# Patient Record
Sex: MALE | Race: WHITE | Employment: OTHER | URBAN - METROPOLITAN AREA
[De-identification: names, ages, dates, MRNs, and addresses within clinical notes are randomized per-mention and may not be internally consistent; named-entity substitution may affect disease eponyms.]

---

## 2017-01-29 ASSESSMENT — ACTIVITIES OF DAILY LIVING (ADL)
DO_MEMBERS_OF_YOUR_HOUSEHOLD_USE_SAFETY_HELMETS?: N
DO_MEMBERS_OF_YOUR_HOUSEHOLD_USE_SUNSCREEN?: N
DO_MEMBERS_OF_YOUR_HOUSEHOLD_WEAR_SEAT_BELTS?: Y
ARE_THERE_FIREARMS_IN_YOUR_HOME?: N
ARE_THERE_SMOKE_DETECTORS_IN_YOUR_HOME?: Y
ARE_THERE_CARBON_MONOXIDE_DETECTORS_IN_YOUR_HOME?: Y

## 2017-02-01 ENCOUNTER — OFFICE VISIT (OUTPATIENT)
Dept: INTERNAL MEDICINE | Facility: CLINIC | Age: 67
End: 2017-02-01

## 2017-02-01 VITALS
DIASTOLIC BLOOD PRESSURE: 87 MMHG | RESPIRATION RATE: 16 BRPM | HEART RATE: 68 BPM | HEIGHT: 69 IN | SYSTOLIC BLOOD PRESSURE: 142 MMHG | BODY MASS INDEX: 27.25 KG/M2 | WEIGHT: 184 LBS

## 2017-02-01 DIAGNOSIS — E78.5 HYPERLIPIDEMIA, UNSPECIFIED HYPERLIPIDEMIA TYPE: ICD-10-CM

## 2017-02-01 DIAGNOSIS — Z11.59 NEED FOR HEPATITIS C SCREENING TEST: ICD-10-CM

## 2017-02-01 DIAGNOSIS — E03.4 HYPOTHYROIDISM DUE TO ACQUIRED ATROPHY OF THYROID: ICD-10-CM

## 2017-02-01 DIAGNOSIS — Z12.11 SCREEN FOR COLON CANCER: ICD-10-CM

## 2017-02-01 DIAGNOSIS — Z23 NEED FOR PROPHYLACTIC VACCINATION AGAINST STREPTOCOCCUS PNEUMONIAE (PNEUMOCOCCUS): ICD-10-CM

## 2017-02-01 DIAGNOSIS — Z23 NEED FOR INFLUENZA VACCINATION: ICD-10-CM

## 2017-02-01 DIAGNOSIS — R35.0 URINARY FREQUENCY: ICD-10-CM

## 2017-02-01 DIAGNOSIS — Z13.6 SCREENING FOR AAA (ABDOMINAL AORTIC ANEURYSM): ICD-10-CM

## 2017-02-01 DIAGNOSIS — E03.4 HYPOTHYROIDISM DUE TO ACQUIRED ATROPHY OF THYROID: Primary | ICD-10-CM

## 2017-02-01 DIAGNOSIS — Z12.11 SCREENING FOR COLON CANCER: ICD-10-CM

## 2017-02-01 DIAGNOSIS — Z23 NEED FOR PROPHYLACTIC VACCINATION AND INOCULATION AGAINST INFLUENZA: ICD-10-CM

## 2017-02-01 LAB
CHOLEST SERPL-MCNC: 134 MG/DL
HCV AB SERPL QL IA: NORMAL
HCV AB SERPL QL IA: NORMAL
HDLC SERPL-MCNC: 58 MG/DL
LDLC SERPL CALC-MCNC: 66 MG/DL
NONHDLC SERPL-MCNC: 76 MG/DL
PSA SERPL-ACNC: 2.25 UG/L (ref 0–4)
TRIGL SERPL-MCNC: 48 MG/DL
TSH SERPL DL<=0.005 MIU/L-ACNC: 1.22 MU/L (ref 0.4–4)

## 2017-02-01 RX ORDER — LEVOTHYROXINE SODIUM 112 UG/1
112 CAPSULE ORAL DAILY
Qty: 90 CAPSULE | Refills: 3 | Status: SHIPPED | OUTPATIENT
Start: 2017-02-01 | End: 2017-11-03

## 2017-02-01 RX ORDER — ATORVASTATIN CALCIUM 80 MG/1
80 TABLET, FILM COATED ORAL DAILY
Qty: 90 TABLET | Refills: 3 | Status: SHIPPED | OUTPATIENT
Start: 2017-02-01 | End: 2018-02-09

## 2017-02-01 ASSESSMENT — PAIN SCALES - GENERAL: PAINLEVEL: NO PAIN (0)

## 2017-02-01 NOTE — PROGRESS NOTES
Tay Hodges is a 67 year old year old male being seen today for   Chief Complaint   Patient presents with     Physical     Pt is here for a physical.        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 Generally doing well. Notes occasional urgency to urinate, occasional nocturia - this represents a change for him. No hematuria, no dysuria.   Using omeproazole only occasionally - twice in last 6 weeks. Notes that reflux sx associated with eating late at night and then going to bed - trying to make lifestyle changes.  Feeling a little anxious today, not sure why - attributes his BP elevation to that.   Will be retiring as of July 1.      ASSESSMENT/PLAN:  1. Healthy 67 year old man here for annual exam. Flu shot, PCV 13 given today. Agrees to colonoscopy for screening after previously declining. Hepatitis C ab screening.  2. Hyperlipidemia - lipids on atorvastatin  3. Hypothyroidism - TSH on levothyroxine.  4. Mild urinary sx, likely due to early BPH. Exam with mild enlargement of prostate, no nodules. PSA today.  5. BP elevated, no dx of HTN. Repeat BP with nurse visit in next few weeks. If still elevated, plan on 24 hour BP monitor before committing to BP medication.  6. F/u 1 year with me, sooner as needed.      Patient Active Problem List   Diagnosis     Hypothyroidism     Hyperlipidemia     Nephrolithiasis     Lip lesion     Coronary atherosclerosis of native coronary artery     Hypothyroidism due to acquired atrophy of thyroid     Hyperlipidemia, unspecified hyperlipidemia       Family History   Problem Relation Age of Onset     CANCER       CANCER       CANCER Father      Lung cancer     Alzheimer Disease Mother      C.A.D. Maternal Grandfather        Immunization History   Administered Date(s) Administered     Influenza (IIV3) 12/14/2011, 12/19/2012, 01/08/2014, 01/14/2015, 01/20/2016     TD (ADULT, 7+) 07/01/2004     TDAP (BOOSTRIX AGES 10-64) 12/19/2012     Zoster vaccine, live 12/19/2012  "        PSA   Date Value Ref Range Status   01/14/2009 0.73 0 - 4 ug/L Final       ROS: See below      Current Outpatient Prescriptions   Medication Sig Dispense Refill     Levothyroxine Sodium 112 MCG CAPS Take 112 mcg by mouth daily 90 capsule 3     omeprazole (PRILOSEC) 20 MG capsule Take 1 capsule (20 mg) by mouth daily (Patient taking differently: Take 20 mg by mouth daily as needed ) 90 capsule 3     atorvastatin (LIPITOR) 80 MG tablet Take 1 tablet (80 mg) by mouth daily 90 tablet 3     ibuprofen (ADVIL) 200 MG tablet Take 200 mg by mouth every 4 hours as needed.       aspirin 325 MG tablet Take 325 mg by mouth daily.       [DISCONTINUED] levothyroxine (SYNTHROID) 125 MCG tablet Take 1 tablet (125 mcg) by mouth daily 90 tablet 3           PHYSICAL EXAM:    /87 mmHg  Pulse 68  Resp 16  Ht 1.745 m (5' 8.7\")  Wt 83.462 kg (184 lb)  BMI 27.41 kg/m2   Wt Readings from Last 1 Encounters:   02/01/17 83.462 kg (184 lb)          Constitutional: no distress, comfortable, pleasant   Eyes: anicteric, normal extra-ocular movements   Ears, Nose and Throat: tympanic membranes clear, nose clear and free of lesions, throat clear, neck supple with full range of motion, no thyromegaly.   Cardiovascular: regular rate and rhythm, normal S1 and S2, no murmurs, rubs or gallops, peripheral pulses full and symmetric   Respiratory: clear to auscultation, no wheezes or crackles, normal breath sounds   Gastrointestinal: positive bowel sounds, nontender, no hepatosplenomegaly, no masses   Rectal: normal tone, nontender normal prostate without nodules    Musculoskeletal: full range of motion, no edema   Skin: no concerning lesions, no jaundice   Neurological: cranial nerves intact, normal strength and sensation, reflexes at patella and biceps normal, normal gait, no tremor   Psychological: appropriate mood   Lymphatic: no cervical  lymphadenopathy    Kim Edwards MD    Answers for HPI/ROS submitted by the patient on " 1/29/2017   General Symptoms: No  Skin Symptoms: No  HENT Symptoms: No  EYE SYMPTOMS: No  HEART SYMPTOMS: No  LUNG SYMPTOMS: No  INTESTINAL SYMPTOMS: No  URINARY SYMPTOMS: No  REPRODUCTIVE SYMPTOMS: No  SKELETAL SYMPTOMS: No  BLOOD SYMPTOMS: No  NERVOUS SYSTEM SYMPTOMS: No  MENTAL HEALTH SYMPTOMS: No

## 2017-02-01 NOTE — PATIENT INSTRUCTIONS
Primary Care Center Medication Refill Request Information:  * Please contact your pharmacy regarding ANY request for medication refills.  ** [facility] Prescription Fax = [phone]  * Please allow 3 business days for routine medication refills.  * Please allow 5 business days for controlled substance medication refills.     Primary Care Center Test Result notification information:  *You will be notified with in 7-10 days of your appointment day regarding the results of your test.  If you are on MyChart you will be notified as soon as the provider has reviewed the results and signed off on them.    Please schedule the following appointments:  Gastroenterology [phone] (4th Floor [facility])   Primary Care Center [phone] (4th Floor [facility])

## 2017-02-01 NOTE — NURSING NOTE
"Administered flu vaccine to patient. Patient tolerated procedure well. See immunization records for details.   FLU VACCINE QUESTIONNAIRE:  Ask the following questions of all parties who want influenza vaccination:     CONTRAINDICATIONS  1.  Is the patient age less than 6 months?  NO  2.  Has the person to be vaccinated ever had Guillain-[place] syndrome? NO  3.  Has the person to be vaccinated had the vaccine this year? NO  4.  Is the person to be vaccinated sick today? NO  5.  Does the person to be vaccinated have an allergy to eggs or a component of the vaccine? NO  6.  Has the person to vaccinated ever had a serious reaction to an influenza vaccination in the past? NO    If the answer to ALL of the above questions is \"No\", then please administer the influenza vaccine per the standard protocol.  If the patient answered \"Yes\" to questions 1 or 2, do not administer the vaccine. If the patient answered \"Yes\" to question 3, do not administer the vaccine unless the patient is a child receiving the vaccine in two doses. If the patient answered \"Yes\" to questions 4, 5, and/or 6, get additional details on sickness and/or reaction and refer to provider. If you have any questions regarding contraindications, please refer to the provider.                                                         INFLUENZA VACCINATION NOTE      Information sheet given to patient and questions answered.   INDICATION FOR VACCINATION:  Anyone from 6 months of age or older.    ALEX Calderon LPN     "

## 2017-02-01 NOTE — NURSING NOTE
Chief Complaint   Patient presents with     Physical     Pt is here for a physical.      Shelly Calderon LPN February 1, 2017 8:01 AM

## 2017-02-01 NOTE — MR AVS SNAPSHOT
After Visit Summary   2/1/2017    Tay Hodges    MRN: [id number]           Patient Information     Date Of Birth          1950        Visit Information        Provider Department      2/1/2017 8:25 AM Kim Edwards MD [facility]        Today's Diagnoses     Hypothyroidism due to acquired atrophy of thyroid    -  1     Hyperlipidemia, unspecified hyperlipidemia type         Screening for colon cancer         Need for prophylactic vaccination against Streptococcus pneumoniae (pneumococcus)         Need for hepatitis C screening test         Need for influenza vaccination         Screen for colon cancer         Need for prophylactic vaccination and inoculation against influenza         Screening for AAA (abdominal aortic aneurysm)         Urinary frequency           Care Instructions    Primary Care Center Medication Refill Request Information:  * Please contact your pharmacy regarding ANY request for medication refills.  ** [facility] Prescription Fax = [phone]  * Please allow 3 business days for routine medication refills.  * Please allow 5 business days for controlled substance medication refills.     Primary Care Center Test Result notification information:  *You will be notified with in 7-10 days of your appointment day regarding the results of your test.  If you are on MyChart you will be notified as soon as the provider has reviewed the results and signed off on them.    Please schedule the following appointments:  Gastroenterology [phone] (4th Floor [facility])   Primary Care Center [phone] (4th Floor [facility])           Follow-ups after your visit        Additional Services     GASTROENTEROLOGY ADULT REF PROCEDURE ONLY       Last Lab Result: CREATININE (mg/dL)       Date                     Value                 12/19/2012               0.86             ----------  Body mass index is 27.41 kg/(m^2).     Needed:  No  Language:   English    Patient will be contacted to schedule procedure.     Please be aware that coverage of these services is subject to the terms and limitations of your health insurance plan.  Call member services at your health plan with any benefit or coverage questions.  Any procedures must be performed at a [facility] facility OR coordinated by your clinic's referral office.    Please bring the following with you to your appointment:    (1) Any X-Rays, CTs or MRIs which have been performed.  Contact the facility where they were done to arrange for  prior to your scheduled appointment.    (2) List of current medications   (3) This referral request   (4) Any documents/labs given to you for this referral                  Future tests that were ordered for you today     Open Future Orders        Priority Expected Expires Ordered    Prostate spec antigen screen Routine  2/1/2018 2/1/2017    Lipid panel reflex to direct LDL Routine  2/1/2018 2/1/2017    TSH with free T4 reflex Routine  2/1/2018 2/1/2017    Hepatitis C antibody Routine  2/1/2018 2/1/2017            Who to contact     Please call your clinic at [phone] to:    Ask questions about your health    Make or cancel appointments    Discuss your medicines    Learn about your test results    Speak to your doctor   If you have compliments or concerns about an experience at your clinic, or if you wish to file a complaint, please contact [facility] Patient Relations at [phone] or email us at [email or web address]         Additional Information About Your Visit        MyChart Information     [employer] gives you secure access to your electronic health record. If you see a primary care provider, you can also send messages to your care team and make appointments. If you have questions, please call your primary care clinic.  If you do not have a primary care provider, please call [phone] and they will assist you.      MyChart  "is an electronic gateway that provides easy, online access to your medical records. With [employer], you can request a clinic appointment, read your test results, renew a prescription or communicate with your care team.     To access your existing account, please contact your [facility] or call [phone] for assistance.        Care EveryWhere ID     This is your Care EveryWhere ID. This could be used by other organizations to access your [facility] medical records  [id number]        Your Vitals Were     Pulse Respirations Height BMI (Body Mass Index)          68 16 1.745 m (5' 8.7\") 27.41 kg/m2         Blood Pressure from Last 3 Encounters:   02/01/17 142/87   01/20/16 132/85   01/14/15 128/85    Weight from Last 3 Encounters:   02/01/17 83.462 kg (184 lb)   01/20/16 84.052 kg (185 lb 4.8 oz)   01/14/15 82.373 kg (181 lb 9.6 oz)              We Performed the Following     AORTIC CENTER NOTIFICATION     FLU VACCINE (HIGH DOSE), INCREASED ANTIGEN, PRESV FREE, AGE 65+     GASTROENTEROLOGY ADULT REF PROCEDURE ONLY     Hepatitis C Screen Reflex to HCV RNA Quant and Genotype     PNEUMOCOCCAL CONJ VACCINE 13 VALENT IM (PCV13)          Today's Medication Changes          These changes are accurate as of: 2/1/17  8:49 AM.  If you have any questions, ask your nurse or doctor.               These medicines have changed or have updated prescriptions.        Dose/Directions    omeprazole 20 MG CR capsule   Commonly known as:  priLOSEC   This may have changed:    - when to take this  - reasons to take this   Used for:  Gastroesophageal reflux disease, esophagitis presence not specified        Dose:  20 mg   Take 1 capsule (20 mg) by mouth daily   Quantity:  90 capsule   Refills:  3            Where to get your medicines      These medications were sent to [facility] [id number] [place], [place] [address] [facility]  [address] [place] [id number]     Phone:  " [phone]    - atorvastatin 80 MG tablet  - Levothyroxine Sodium 112 MCG Caps             Primary Care Provider Office Phone # Fax #    Kim Edwards [phone]        [facility] [address] [facility] [address]  [facility] [address]        Thank you!     Thank you for choosing [facility]  for your care. Our goal is always to provide you with excellent care. Hearing back from our patients is one way we can continue to improve our services. Please take a few minutes to complete the written survey that you may receive in the mail after your visit with us. Thank you!             Your Updated Medication List - Protect others around you: Learn how to safely use, store and throw away your medicines at www.disposemymeds.org.          This list is accurate as of: 2/1/17  8:49 AM.  Always use your most recent med list.                   Brand Name Dispense Instructions for use    ADVIL 200 MG tablet   Generic drug:  ibuprofen      Take 200 mg by mouth every 4 hours as needed.       aspirin 325 MG tablet      Take 325 mg by mouth daily.       atorvastatin 80 MG tablet    LIPITOR    90 tablet    Take 1 tablet (80 mg) by mouth daily       Levothyroxine Sodium 112 MCG Caps     90 capsule    Take 112 mcg by mouth daily       omeprazole 20 MG CR capsule    priLOSEC    90 capsule    Take 1 capsule (20 mg) by mouth daily

## 2017-02-02 ENCOUNTER — TELEPHONE (OUTPATIENT)
Dept: INTERNAL MEDICINE | Facility: CLINIC | Age: 67
End: 2017-02-02

## 2017-02-02 DIAGNOSIS — E03.4 HYPOTHYROIDISM DUE TO ACQUIRED ATROPHY OF THYROID: Primary | ICD-10-CM

## 2017-02-02 RX ORDER — LEVOTHYROXINE SODIUM 112 UG/1
112 TABLET ORAL DAILY
Qty: 90 TABLET | Refills: 3 | Status: SHIPPED | OUTPATIENT
Start: 2017-02-02 | End: 2018-02-09

## 2017-02-02 NOTE — TELEPHONE ENCOUNTER
Telephone Encounter    Caller: [facility].     Reason for Call/Caller's Request: Prescriber sent Rx for levothyroxine capsules, which according to the pharmacy is BRAND, not generic, and thus, more expensive.      Nursing Action or Plan: Rx generated for same dose levothyroxine in tablet form.

## 2017-02-08 ENCOUNTER — DOCUMENTATION ONLY (OUTPATIENT)
Dept: VASCULAR SURGERY | Facility: CLINIC | Age: 67
End: 2017-02-08

## 2017-02-14 ENCOUNTER — DOCUMENTATION ONLY (OUTPATIENT)
Dept: VASCULAR SURGERY | Facility: CLINIC | Age: 67
End: 2017-02-14

## 2017-05-09 ENCOUNTER — DOCUMENTATION ONLY (OUTPATIENT)
Dept: VASCULAR SURGERY | Facility: CLINIC | Age: 67
End: 2017-05-09

## 2017-11-03 ENCOUNTER — OFFICE VISIT (OUTPATIENT)
Dept: INTERNAL MEDICINE | Facility: CLINIC | Age: 67
End: 2017-11-03

## 2017-11-03 VITALS
OXYGEN SATURATION: 97 % | RESPIRATION RATE: 20 BRPM | DIASTOLIC BLOOD PRESSURE: 71 MMHG | BODY MASS INDEX: 27.66 KG/M2 | SYSTOLIC BLOOD PRESSURE: 151 MMHG | WEIGHT: 185.7 LBS | HEART RATE: 71 BPM

## 2017-11-03 DIAGNOSIS — Z23 NEED FOR PROPHYLACTIC VACCINATION AND INOCULATION AGAINST INFLUENZA: ICD-10-CM

## 2017-11-03 DIAGNOSIS — Z12.11 SCREEN FOR COLON CANCER: ICD-10-CM

## 2017-11-03 DIAGNOSIS — Z91.81 AT RISK FOR FALLING: ICD-10-CM

## 2017-11-03 DIAGNOSIS — G44.89 OTHER HEADACHE SYNDROME: Primary | ICD-10-CM

## 2017-11-03 ASSESSMENT — PAIN SCALES - GENERAL: PAINLEVEL: SEVERE PAIN (7)

## 2017-11-03 NOTE — NURSING NOTE
Chief Complaint   Patient presents with     Headache     Has severe headache ( back of neck) for a week, and is now into right ear    Marie Julien LPN 1:14 PM on 11/3/2017

## 2017-11-03 NOTE — MR AVS SNAPSHOT
After Visit Summary   11/3/2017    Tay Hodges    MRN: [id number]           Patient Information     Date Of Birth          1950        Visit Information        Provider Department      11/3/2017 1:20 PM Magnus Townsend MD [facility]        Today's Diagnoses     Other headache syndrome    -  1    Screen for colon cancer        At risk for falling        Need for prophylactic vaccination and inoculation against influenza          Care Instructions    Primary Care Center: [phone]     Primary Care Center Medication Refill Request Information:  * Please contact your pharmacy regarding ANY request for medication refills.  ** [facility] Fax = [phone]  * Please allow 3 business days for routine medication refills.  * Please allow 5 business days for controlled substance medication refills.     Primary Care Center Test Result notification information:  *You will be notified with in 7-10 days of your appointment day regarding the results of your test.  If you are on MyChart you will be notified as soon as the provider has reviewed the results and signed off on them.          Follow-ups after your visit        Future tests that were ordered for you today     Open Future Orders        Priority Expected Expires Ordered    Fecal colorectal cancer screen FIT - Future (S+30) Routine 11/24/2017 12/3/2017 11/3/2017            Who to contact     Please call your clinic at [phone] to:    Ask questions about your health    Make or cancel appointments    Discuss your medicines    Learn about your test results    Speak to your doctor   If you have compliments or concerns about an experience at your clinic, or if you wish to file a complaint, please contact [facility] Patient Relations at [phone] or email us at [email or web address]         Additional Information About Your Visit        MyChart Information     MyChart gives  you secure access to your electronic health record. If you see a primary care provider, you can also send messages to your care team and make appointments. If you have questions, please call your primary care clinic.  If you do not have a primary care provider, please call [phone] and they will assist you.      [employer] is an electronic gateway that provides easy, online access to your medical records. With [employer], you can request a clinic appointment, read your test results, renew a prescription or communicate with your care team.     To access your existing account, please contact your [facility] or call [phone] for assistance.        Care EveryWhere ID     This is your Care EveryWhere ID. This could be used by other organizations to access your [facility] medical records  [id number]        Your Vitals Were     Pulse Respirations Pulse Oximetry BMI (Body Mass Index)          71 20 97% 27.66 kg/m2         Blood Pressure from Last 3 Encounters:   11/03/17 151/71   02/01/17 142/87   01/20/16 132/85    Weight from Last 3 Encounters:   11/03/17 84.2 kg (185 lb 11.2 oz)   02/01/17 83.5 kg (184 lb)   01/20/16 84.1 kg (185 lb 4.8 oz)              We Performed the Following     CTA Angiogram Head     FLU VACCINE, INCREASED ANTIGEN, PRESV FREE, AGE 65+ [71246]        Primary Care Provider Office Phone # Fax #    Louann Villanueva [phone]       [address] [place] [address] [address] [place] [address]        Equal Access to Services     ALEX PEREZ : Hadii aad ku hadasho Soomaali, waaxda luqadaha, qaybta kaalmada adeegyada, lobo ewingn yojana downs . So [facility] [phone].    ATENCIÓN: Si habla español, tiene a fitch disposición servicios gratuitos de asistencia lingüística. Llame al [phone].    We comply with applicable federal civil rights laws and [place] laws. We do not discriminate on the basis of race, color, national origin, age, disability, sex,  sexual orientation, or gender identity.            Thank you!     Thank you for choosing [facility]  for your care. Our goal is always to provide you with excellent care. Hearing back from our patients is one way we can continue to improve our services. Please take a few minutes to complete the written survey that you may receive in the mail after your visit with us. Thank you!             Your Updated Medication List - Protect others around you: Learn how to safely use, store and throw away your medicines at www.disposemymeds.org.          This list is accurate as of: 11/3/17  2:47 PM.  Always use your most recent med list.                   Brand Name Dispense Instructions for use Diagnosis    ADVIL 200 MG tablet   Generic drug:  ibuprofen      Take 200 mg by mouth every 4 hours as needed.        aspirin 325 MG tablet      Take 325 mg by mouth daily.        atorvastatin 80 MG tablet    LIPITOR    90 tablet    Take 1 tablet (80 mg) by mouth daily    Hyperlipidemia, unspecified hyperlipidemia type       levothyroxine 112 MCG tablet    SYNTHROID/LEVOTHROID    90 tablet    Take 1 tablet (112 mcg) by mouth daily    Hypothyroidism due to acquired atrophy of thyroid

## 2017-11-03 NOTE — PROGRESS NOTES
HPI  67-year-old retired  presents today with a one-week history of headache. Said no past history of significant headaches or similar problems. Reported a week ago he had onset of severe pain in the right neck. This radiated up into the occipital area and more recently into the right ear. It has not bothered by bending over or position however it does wake him up at night periodically. He is not had any nausea or vomiting associated with this and there's been no focal neurologic symptoms. He denies any injury or trauma unusual movements or activities. His never had any similar headaches. He's been in excellent health until this episode. Otherwise he has no particular symptoms or concerns today.  Past Medical History:   Diagnosis Date     Coronary atherosclerosis of native coronary artery 12/19/2012    Mild disease seen on angiogram     Nephrolithiasis 12/13/2011     Other and unspecified hyperlipidemia 12/13/2011     Unspecified hypothyroidism 12/13/2011     No past surgical history on file.  Family History   Problem Relation Age of Onset     CANCER       CANCER       CANCER Father      Lung cancer     Alzheimer Disease Mother      C.A.D. Maternal Grandfather      Social History     Social History     Marital status:      Spouse name: N/A     Number of children: N/A     Years of education: N/A     Social History Main Topics     Smoking status: Never Smoker     Smokeless tobacco: Never Used     Alcohol use Yes      Comment: Occasional     Drug use: None     Sexual activity: Not Asked     Other Topics Concern     None     Social History Narrative     Complete review of symptoms negative except as noted above.    Exam:  /71 (BP Location: Right arm, Patient Position: Chair, Cuff Size: Adult Large)  Pulse 71  Resp 20  Wt 84.2 kg (185 lb 11.2 oz)  SpO2 97%  BMI 27.66 kg/m2  185 lbs 11.2 oz  Physical Exam   The patient is alert, oriented with a clear sensorium.   Skin shows no  lesions or rashes and good turgor.   Head is normocephalic and atraumatic.   Eyes show PERRLA with benign optic fundi, normal venous pulsations.   Ears show normal TMs bilaterally.   Mouth shows clear oral mucosa.   Neck shows no nodes, thyromegaly or bruits, some stiffness and decreased ROM, negative Kernigs and Brudzinskis signs.   Back is non tender.  Lungs are clear to percussion and auscultation.   Heart shows normal S1 and S2 without murmur or gallop.   Extremities show no edema and no evidence of active synovitis.   Neurologic examination shows cranial nerves II-XII intact. Motor shows 5/5 strength. Reflexes are symmetric. Cerebellar testing shows normal tandem gait.  Romberg negative.  CTA reviewed with radiologist showing no evidence of bleed or aneurysm    ASSESSMENT  1. Headache likely related to cervical osteoarthritis  2 hypertension aggravated by above  3 CAD stable    Plan  I'm and have him use heat and Tylenol and will anticipate resolution of the headache over the next week follow-up if increasing severity or new or different symptoms.  Over 25 minutes spent with patient the majority in counseling and coordinating care.      This note was completed using Dragon voice recognition software.  Although reviewed after completion, some word and grammatical errors may occur.    Magnus Townsend MD  General Internal Medicine  Primary Care Center  [phone]

## 2018-01-26 ASSESSMENT — ENCOUNTER SYMPTOMS
DYSURIA: 0
FLANK PAIN: 0
HEMATURIA: 0
DIFFICULTY URINATING: 0

## 2018-01-26 ASSESSMENT — ACTIVITIES OF DAILY LIVING (ADL)
IN_THE_PAST_7_DAYS,_DID_YOU_NEED_HELP_FROM_OTHERS_TO_PERFORM_EVERYDAY_ACTIVITIES_SUCH_AS_EATING,_GETTING_DRESSED,_GROOMING,_BATHING,_WALKING,_OR_USING_THE_TOILET: N
IN_THE_PAST_7_DAYS,_DID_YOU_NEED_HELP_FROM_OTHERS_TO_TAKE_CARE_OF_THINGS_SUCH_AS_LAUNDRY_AND_HOUSEKEEPING,_BANKING,_SHOPPING,_USING_THE_TELEPHONE,_FOOD_PREPARATION,_TRANSPORTATION,_OR_TAKING_YOUR_OWN_MEDICATIONS?: N

## 2018-02-09 ENCOUNTER — OFFICE VISIT (OUTPATIENT)
Dept: INTERNAL MEDICINE | Facility: CLINIC | Age: 68
End: 2018-02-09
Payer: MEDICARE

## 2018-02-09 ENCOUNTER — TELEPHONE (OUTPATIENT)
Dept: GASTROENTEROLOGY | Facility: CLINIC | Age: 68
End: 2018-02-09

## 2018-02-09 ENCOUNTER — HOSPITAL ENCOUNTER (OUTPATIENT)
Dept: CARDIOLOGY | Facility: CLINIC | Age: 68
Discharge: HOME | End: 2018-02-09
Attending: INTERNAL MEDICINE | Admitting: INTERNAL MEDICINE
Payer: MEDICARE

## 2018-02-09 VITALS
SYSTOLIC BLOOD PRESSURE: 152 MMHG | BODY MASS INDEX: 27.61 KG/M2 | TEMPERATURE: 97.9 F | HEIGHT: 69 IN | DIASTOLIC BLOOD PRESSURE: 90 MMHG | HEART RATE: 68 BPM | WEIGHT: 186.4 LBS | RESPIRATION RATE: 18 BRPM | OXYGEN SATURATION: 99 %

## 2018-02-09 DIAGNOSIS — Z12.11 SPECIAL SCREENING FOR MALIGNANT NEOPLASMS, COLON: ICD-10-CM

## 2018-02-09 DIAGNOSIS — E78.2 MIXED HYPERLIPIDEMIA: ICD-10-CM

## 2018-02-09 DIAGNOSIS — Z00.00 ROUTINE GENERAL MEDICAL EXAMINATION AT A HEALTH CARE FACILITY: ICD-10-CM

## 2018-02-09 DIAGNOSIS — Z00.00 ROUTINE GENERAL MEDICAL EXAMINATION AT A HEALTH CARE FACILITY: Primary | ICD-10-CM

## 2018-02-09 DIAGNOSIS — R03.0 ELEVATED BLOOD PRESSURE READING WITHOUT DIAGNOSIS OF HYPERTENSION: ICD-10-CM

## 2018-02-09 DIAGNOSIS — E78.5 HYPERLIPIDEMIA, UNSPECIFIED HYPERLIPIDEMIA TYPE: ICD-10-CM

## 2018-02-09 DIAGNOSIS — E03.9 HYPOTHYROIDISM, UNSPECIFIED TYPE: ICD-10-CM

## 2018-02-09 DIAGNOSIS — R35.0 URINARY FREQUENCY: ICD-10-CM

## 2018-02-09 DIAGNOSIS — R93.5 ABNORMAL ABDOMINAL ULTRASOUND: ICD-10-CM

## 2018-02-09 DIAGNOSIS — E03.4 HYPOTHYROIDISM DUE TO ACQUIRED ATROPHY OF THYROID: ICD-10-CM

## 2018-02-09 DIAGNOSIS — Z23 NEED FOR PROPHYLACTIC VACCINATION AGAINST STREPTOCOCCUS PNEUMONIAE (PNEUMOCOCCUS): ICD-10-CM

## 2018-02-09 LAB
ALBUMIN SERPL-MCNC: 4.1 G/DL (ref 3.4–5)
ALP SERPL-CCNC: 42 U/L (ref 40–150)
ALT SERPL W P-5'-P-CCNC: 27 U/L (ref 0–70)
ANION GAP SERPL CALCULATED.3IONS-SCNC: 4 MMOL/L (ref 3–14)
AST SERPL W P-5'-P-CCNC: 18 U/L (ref 0–45)
BILIRUB SERPL-MCNC: 0.6 MG/DL (ref 0.2–1.3)
BUN SERPL-MCNC: 25 MG/DL (ref 7–30)
CALCIUM SERPL-MCNC: 9 MG/DL (ref 8.5–10.1)
CHLORIDE SERPL-SCNC: 104 MMOL/L (ref 94–109)
CHOLEST SERPL-MCNC: 138 MG/DL
CO2 SERPL-SCNC: 30 MMOL/L (ref 20–32)
CREAT SERPL-MCNC: 0.95 MG/DL (ref 0.66–1.25)
GFR SERPL CREATININE-BSD FRML MDRD: 80 ML/MIN/1.7M2
GLUCOSE SERPL-MCNC: 101 MG/DL (ref 70–99)
HDLC SERPL-MCNC: 58 MG/DL
LDLC SERPL CALC-MCNC: 73 MG/DL
NONHDLC SERPL-MCNC: 81 MG/DL
POTASSIUM SERPL-SCNC: 4.3 MMOL/L (ref 3.4–5.3)
PROT SERPL-MCNC: 7.5 G/DL (ref 6.8–8.8)
PSA SERPL-ACNC: 1.76 UG/L (ref 0–4)
SODIUM SERPL-SCNC: 138 MMOL/L (ref 133–144)
TRIGL SERPL-MCNC: 41 MG/DL
TSH SERPL DL<=0.005 MIU/L-ACNC: 1.28 MU/L (ref 0.4–4)

## 2018-02-09 PROCEDURE — 93790 AMBL BP MNTR W/SW I&R: CPT | Performed by: INTERNAL MEDICINE

## 2018-02-09 PROCEDURE — 93786 AMBL BP MNTR W/SW REC ONLY: CPT

## 2018-02-09 RX ORDER — ATORVASTATIN CALCIUM 80 MG/1
80 TABLET, FILM COATED ORAL DAILY
Qty: 90 TABLET | Refills: 3 | Status: SHIPPED | OUTPATIENT
Start: 2018-02-09 | End: 2019-01-24

## 2018-02-09 RX ORDER — LEVOTHYROXINE SODIUM 112 UG/1
112 TABLET ORAL DAILY
Qty: 90 TABLET | Refills: 3 | Status: SHIPPED | OUTPATIENT
Start: 2018-02-09 | End: 2019-01-24

## 2018-02-09 ASSESSMENT — PAIN SCALES - GENERAL: PAINLEVEL: NO PAIN (0)

## 2018-02-09 NOTE — MR AVS SNAPSHOT
After Visit Summary   2/9/2018    Tay Hodges    MRN: [id number]           Patient Information     Date Of Birth          1950        Visit Information        Provider Department      2/9/2018 8:25 AM Louann Villanueva MD [facility]        Today's Diagnoses     Routine general medical examination at a health care facility    -  1    Need for prophylactic vaccination against Streptococcus pneumoniae (pneumococcus)        Special screening for malignant neoplasms, colon        Elevated blood pressure reading without diagnosis of hypertension        Abnormal abdominal ultrasound        Hypothyroidism, unspecified type        Mixed hyperlipidemia        Urinary frequency          Care Instructions    Primary Care Center Medication Refill Request Information:  * Please contact your pharmacy regarding ANY request for medication refills.  ** [facility] Prescription Fax = [phone]  * Please allow 3 business days for routine medication refills.  * Please allow 5 business days for controlled substance medication refills.     Primary Care Center Test Result notification information:  *You will be notified with in 7-10 days of your appointment day regarding the results of your test.  If you are on MyChart you will be notified as soon as the provider has reviewed the results and signed off on them.    Primary Care Center [phone]     Colonoscopy  [phone]            Follow-ups after your visit        Additional Services     GASTROENTEROLOGY ADULT REF PROCEDURE ONLY       Last Lab Result: Creatinine (mg/dL)       Date                     Value                 12/19/2012               0.86             ----------  Body mass index is 27.53 kg/(m^2).     Needed:  No  Language:  English    Patient will be contacted to schedule procedure.     Please be aware that coverage of these services is subject to the terms and limitations of your health insurance plan.  Call member  services at your health plan with any benefit or coverage questions.  Any procedures must be performed at a [facility] facility OR coordinated by your clinic's referral office.    Please bring the following with you to your appointment:    (1) Any X-Rays, CTs or MRIs which have been performed.  Contact the facility where they were done to arrange for  prior to your scheduled appointment.    (2) List of current medications   (3) This referral request   (4) Any documents/labs given to you for this referral                  Follow-up notes from your care team     Return in about 1 year (around 2/9/2019) for Physical Exam.      Your next 10 appointments already scheduled     Feb 09, 2018  9:30 AM CST   LAB with  LAB   [facility] ([facility])    [address] [place] [id number] [phone]           Please do not eat 10-12 hours before your appointment if you are coming in fasting for labs on lipids, cholesterol, or glucose (sugar). This does not apply to pregnant women. Water, hot tea and black coffee (with nothing added) are okay. Do not drink other fluids, diet soda or chew gum.            Feb 09, 2018 10:30 AM CST   24 Hour Blood Pressure Monitor with UUEKGM   [facility] ([facility]    [address] [facility] [address]               Feb 12, 2018  9:45 AM CST   US ABDOMEN LIMITED with UCUS2   [facility] ([facility])    [address] [place] [address] [phone]           Please bring a list of your medicines (including vitamins, minerals and over-the-counter drugs). Also, tell your doctor about any allergies you may have. Wear comfortable clothes and leave your valuables at home.  Adults: No eating or drinking for 8 hours before the exam. You may take medicine with a small sip of water.  Children: - Children 6+ years: No food  or drink for 6 hours before exam. - Children 1-5 years: No food or drink for 4 hours before exam. - Infants, breast-fed: may have breast milk up to 2 hours before exam. - Infants, formula: may have bottle until 4 hours before exam.  Please call the Imaging Department at your exam site with any questions.              Future tests that were ordered for you today     Open Future Orders        Priority Expected Expires Ordered    24 Hour Blood Pressure Monitor - Adult Routine  3/26/2018 2/9/2018    US Abdomen Limited Routine  2/9/2019 2/9/2018    PSA screen Routine 2/9/2018 2/9/2019 2/9/2018    Lipid panel reflex to direct LDL Fasting Routine 2/9/2018 2/23/2018 2/9/2018    Comprehensive metabolic panel Routine 2/9/2018 2/23/2018 2/9/2018    TSH Routine 2/9/2018 2/9/2019 2/9/2018            Who to contact     Please call your clinic at [phone] to:    Ask questions about your health    Make or cancel appointments    Discuss your medicines    Learn about your test results    Speak to your doctor            Additional Information About Your Visit        [employer] Information     [employer] gives you secure access to your electronic health record. If you see a primary care provider, you can also send messages to your care team and make appointments. If you have questions, please call your primary care clinic.  If you do not have a primary care provider, please call [phone] and they will assist you.      [employer] is an electronic gateway that provides easy, online access to your medical records. With [employer], you can request a clinic appointment, read your test results, renew a prescription or communicate with your care team.     To access your existing account, please contact your [facility] or call [phone] for assistance.        Care EveryWhere ID     This is your Care EveryWhere ID. This could be used by other organizations to access your [facility] medical records  [id number]       "  Your Vitals Were     Pulse Temperature Respirations Height Pulse Oximetry BMI (Body Mass Index)    68 97.9  F (36.6  C) (Oral) 18 1.753 m (5' 9\") 99% 27.53 kg/m2       Blood Pressure from Last 3 Encounters:   02/09/18 152/90   11/03/17 151/71   02/01/17 142/87    Weight from Last 3 Encounters:   02/09/18 84.6 kg (186 lb 6.4 oz)   11/03/17 84.2 kg (185 lb 11.2 oz)   02/01/17 83.5 kg (184 lb)              We Performed the Following     GASTROENTEROLOGY ADULT REF PROCEDURE ONLY     Pneumococcal vaccine 23 valent PPSV23  (Pneumovax) [63851]        Primary Care Provider Office Phone # Fax #    Louann Villanueva [phone] [address] [address]        Equal Access to Services     [facility] : Hadii jamison gaminoo Sogrant, waaxda luqadaha, qaybta kaalmada ademalayada, lobo downs . So [facility] [phone].    ATENCIÓN: Si habla español, tiene a fitch disposición servicios gratuitos de asistencia lingüística. Llame al [phone].    We comply with applicable federal civil rights laws and [place] laws. We do not discriminate on the basis of race, color, national origin, age, disability, sex, sexual orientation, or gender identity.            Thank you!     Thank you for choosing [facility]  for your care. Our goal is always to provide you with excellent care. Hearing back from our patients is one way we can continue to improve our services. Please take a few minutes to complete the written survey that you may receive in the mail after your visit with us. Thank you!             Your Updated Medication List - Protect others around you: Learn how to safely use, store and throw away your medicines at www.disposemymeds.org.          This list is accurate as of 2/9/18  9:15 AM.  Always use your most recent med list.                   Brand Name Dispense Instructions for use Diagnosis    ADVIL 200 MG tablet   Generic drug:  ibuprofen "      Take 200 mg by mouth every 4 hours as needed.        aspirin 325 MG tablet      Take 325 mg by mouth daily.        atorvastatin 80 MG tablet    LIPITOR    90 tablet    Take 1 tablet (80 mg) by mouth daily    Hyperlipidemia, unspecified hyperlipidemia type       levothyroxine 112 MCG tablet    SYNTHROID/LEVOTHROID    90 tablet    Take 1 tablet (112 mcg) by mouth daily    Hypothyroidism due to acquired atrophy of thyroid

## 2018-02-09 NOTE — PATIENT INSTRUCTIONS
[facility] Medication Refill Request Information:  * Please contact your pharmacy regarding ANY request for medication refills.  ** [facility] Prescription Fax = [phone]  * Please allow 3 business days for routine medication refills.  * Please allow 5 business days for controlled substance medication refills.     [facility] Test Result notification information:  *You will be notified with in 7-10 days of your appointment day regarding the results of your test.  If you are on MyChart you will be notified as soon as the provider has reviewed the results and signed off on them.    [facility] [phone]     [facility]  [phone]

## 2018-02-09 NOTE — NURSING NOTE
Administered Pneumococcal Pneumovax 23 vaccine (see Immunizations in Chart Review). Patient tolerated well.  Avila Hilario CMA at 8:26 AM on 2/9/2018

## 2018-02-09 NOTE — PROGRESS NOTES
[facility]  Primary Care Center   Louann Villanueva MD  02/09/2018      Chief Complaint:   Physical (Patient is here for annual physical exam) and Refill Request (Also here for change of thyroid medication to Generic Synthroid and need atorvastation refills)       History of Present Illness:   Tay Hodges is a 68 year old male with a history of hyperlipidemia, hypothyroidism who presents for a physical. Overall, he is doing well. As he is a new patient to me, we reviewed his chronic medical problems, as below.    Hypothyroidism. The patient had biopsy and partial thyroidectomy 25 years ago. There was no malignancy found at that time and he has never undergone radiation. His hypothyroidism has been managed on the same dose of Synthroid for many years without difficulty. Today, he is requesting to be changed to a generic version of Synthroid given his recent shelter and insurance change.    Hyperlipidemia. Stable on Lisinopril.    Remote history of kidney stones. He had difficulty with these approximately 20 years ago requiring lithotripsy. He did not change his diet in the interim for control. He has not had a kidney stone since.     Elevated BP. His blood pressure was elevated at his last physical on 2/01/2017 and is elevated again at 152/90 today. He has begun exercising regularly and eating a healthier diet. He does not have a family history of hypertension. He drinks a glass of wine about every other day. The patient is not interested in maintenance medication for this.     Liver nodule. He had an incidental finding of a 1 cm nodule on his liver in 2007, which was followed up with ultrasound. He has not rechecked this since.     The patient presents today with two main concerns. Primarily, he describes persistent soreness in his right lateral neck. He feels this is a muscular pain related to stagnant positions, such as looking down at a laptop for prolonged periods of time. This usually happens twice  weekly and is well controlled with Tylenol and activity adjustment. He stretches his neck and uses a pulsating shower to prevent.  He has not had issues with neck arthritis in the past, and no trauma occurred before neck pain started.  A recent CTA was normal.    Additionally, he describes difficulty with increased frequency of urination, waking 2-3 times per night to use the bathroom. His past exams and PSA studies have been normal. No family hx of prostate cancer. No sudden change in symptoms.    We reviewed the health maintenance topics including immunizations, cancer screenings, routine blood work, and, lifestyle factors and risk behaviors.  He has no other complaints today.       Review of Systems:   A full 10-pt Review of Systems was performed, verified and is negative except as documented in the HPI.  All health questionnaires were reviewed, verified and relevant information documented above.    Answers for HPI/ROS submitted by the patient on 1/26/2018:  General Symptoms: No  Skin Symptoms: No  HENT Symptoms: No  EYE SYMPTOMS: No  HEART SYMPTOMS: No  LUNG SYMPTOMS: No  INTESTINAL SYMPTOMS: No  URINARY SYMPTOMS: Yes  REPRODUCTIVE SYMPTOMS: No  SKELETAL SYMPTOMS: No  BLOOD SYMPTOMS: No  NERVOUS SYSTEM SYMPTOMS: No  MENTAL HEALTH SYMPTOMS: No  Trouble holding urine or incontinence: Yes  Pain or burning: No  Trouble starting or stopping: No  Increased frequency of urination: No  Blood in urine: No  Decreased frequency of urination: No  Frequent nighttime urination: Yes  Flank pain: No  Difficulty emptying bladder: No    Active Medications:     Current Outpatient Prescriptions:      levothyroxine (SYNTHROID/LEVOTHROID) 112 MCG tablet, Take 1 tablet (112 mcg) by mouth daily, Disp: 90 tablet, Rfl: 3     atorvastatin (LIPITOR) 80 MG tablet, Take 1 tablet (80 mg) by mouth daily, Disp: 90 tablet, Rfl: 3     ibuprofen (ADVIL) 200 MG tablet, Take 200 mg by mouth every 4 hours as needed., Disp: , Rfl:      aspirin 325 MG  "tablet, Take 325 mg by mouth daily., Disp: , Rfl:      Allergies:   Review of patient's allergies indicates no known allergies.      Past Medical History:  Coronary atherosclerosis   Nephrolithiasis    Hyperlipidemia   Hypothyroidism      Past Surgical History:  Partial thyroidectomy  Lithotripsy     Family History:   Father - lung cancer  Mother - alzheimer's disease  Maternal grandfather - coronary artery disease    Sister-Melanoma     Social History:   No history of tobacco use.   Occasional alcohol use.     Physical Exam:   /90 (BP Location: Right arm, Patient Position: Chair, Cuff Size: Adult Regular)  Pulse 68  Temp 97.9  F (36.6  C) (Oral)  Resp 18  Ht 1.753 m (5' 9\")  Wt 84.6 kg (186 lb 6.4 oz)  SpO2 99%  BMI 27.53 kg/m2   Constitutional: Alert, oriented, pleasant, no acute distress  Head: Normocephalic, atraumatic  Eyes: Extra-ocular movements intact, no scleral icterus  ENT: Oropharynx clear, moist mucus membranes, good dentition  Neck: Supple, no lymphadenopathy,  horizontal incision from thyroid surgery, no nodules  Cardiovascular: Regular rate and rhythm, no murmurs, rubs or gallops, peripheral pulses full/symmetric  Respiratory: Good air movement bilaterally, lungs clear, no wheezes/rales/rhonchi  GI: Abdomen soft, bowel sounds present, nondistended, nontender, no organomegaly or masses, no rebound/guarding  KASSI: Declined by patient  Musculoskeletal: No edema, normal muscle tone, normal gait  Neurologic: Alert and oriented, cranial nerves 2-12 intact.  Skin: multiple seborrheic keratosis on trunk and benign appearing nevi  Psychiatric: normal mentation, affect and mood     Assessment and Plan:    Elevated blood pressure reading without diagnosis of hypertension  Patient's blood pressure has been high at hospital visits. A 24 hour blood pressure monitor will be ordered to determine if treatment necessary.  - 24 Hour Blood Pressure Monitor - Adult    Abnormal abdominal ultrasound  Patient " had a previous ultrasound showing a 1cm nodule on his liver. Will schedule an ultrasound to check status of lesion.  - US Abdomen Limited    Hypothyroidism, unspecified type  Patient had a thyroidectomy, routine TSH check.  - TSH    Mixed hyperlipidemia  Routine lipid panel, patient fasted this morning.  - Lipid panel reflex to direct LDL Fasting    Urinary frequency  The patient reports gradual increased urinary frequency, and waking 2-3 times per night to go. He is not interested in medication.  - PSA screen    Routine Health Maintenance  The patient is 16 years past due for colonoscopy, as he has not elected to have this done to date. We discussed the importance of screening and difference between testing options. Patient stated interest in having colonoscopy with Dr. Cash. Referral placed, as below. Other health maintenance orders as below.   - GASTROENTEROLOGY ADULT REF PROCEDURE ONLY  - Pneumococcal vaccine 23 valent PPSV23  (Pneumovax) [16254]  - Comprehensive metabolic panel    Immunizations (zoster, pneumovax, flu, Tdap, Hep A/B):   Most Recent Immunizations   Administered Date(s) Administered     Influenza (High Dose) 3 valent vaccine 02/01/2017     Influenza (IIV3) PF 01/20/2016     Pneumo Conj 13-V (2010&after) 02/01/2017     Pneumococcal 23 valent 02/09/2018     TD (ADULT, 7+) 07/01/2004     TDAP Vaccine (Boostrix) 12/19/2012     Zoster vaccine, live 12/19/2012     Lipids:   Recent Labs   Lab Test  02/09/18   0950  02/01/17   0919   01/14/15   1056  12/19/12   0957   CHOL  138  134   < >  149  151   HDL  58  58   < >  67  55   LDL  73  66   < >  74  88   TRIG  41  48   < >  40  38   CHOLHDLRATIO   --    --    --   2.2  2.7    < > = values in this interval not displayed.     PSA (50-75 yrs):   PSA   Date Value Ref Range Status   02/01/2017 2.25 0 - 4 ug/L Final     Comment:     Assay Method:  Chemiluminescence using Siemens Vista analyzer      AAA Screening (65-75 yrs): n/a  Lung Ca Screening (>30 pk  age 55-79 or >20 py age 50-79 + RF): n/a  Colonoscopy (50-75 yrs): ordered  Dexa (>65W or 70M yrs): n/a  HIV/HCV if risk factors: HCV neg 2017  Safety/Lifestyle: reviewed  Tob/EtOH: reviewed  Depression: screen neg  Advanced Directive:  deferred       Follow-up: Return in about 1 year (around 2/9/2019) for Physical Exam.         Scribe Disclosure:   We, Meghna Arciniega and Dave Concepcion, are serving as scribes to document services personally performed by Louann Villanueva MD at this visit, based upon the provider's statements to me. All documentation has been reviewed by the aforementioned provider prior to being entered into the official medical record.     Portions of this medical record were completed by a scribe. UPON MY REVIEW AND AUTHENTICATION BY ELECTRONIC SIGNATURE, this confirms (a) I performed the applicable clinical services, and (b) the record is accurate.   Louann Villanueva MD  Internal Medicine

## 2018-02-09 NOTE — NURSING NOTE
Chief Complaint   Patient presents with     Physical     Patient is here for annual physical exam     Refill Request     Also here for change of thyroid medication to Generic Synthroid and need atorvastation refills     Avila Hilario CMA (AAMA) at 8:16 AM on 2/9/2018

## 2018-02-12 ENCOUNTER — RADIANT APPOINTMENT (OUTPATIENT)
Dept: ULTRASOUND IMAGING | Facility: CLINIC | Age: 68
End: 2018-02-12
Attending: INTERNAL MEDICINE
Payer: MEDICARE

## 2018-02-12 ENCOUNTER — TELEPHONE (OUTPATIENT)
Dept: GASTROENTEROLOGY | Facility: CLINIC | Age: 68
End: 2018-02-12

## 2018-02-12 DIAGNOSIS — R93.5 ABNORMAL ABDOMINAL ULTRASOUND: ICD-10-CM

## 2018-02-13 ENCOUNTER — TELEPHONE (OUTPATIENT)
Dept: GASTROENTEROLOGY | Facility: CLINIC | Age: 68
End: 2018-02-13

## 2018-02-15 ENCOUNTER — TELEPHONE (OUTPATIENT)
Dept: INTERNAL MEDICINE | Facility: CLINIC | Age: 68
End: 2018-02-15

## 2018-02-15 DIAGNOSIS — K76.9 LIVER LESION: Primary | ICD-10-CM

## 2018-02-15 NOTE — TELEPHONE ENCOUNTER
Discussed recommendations with patient. Patient will call to schedule MRI.  Patient verbalizes understanding.  Roz Roth LPN

## 2018-02-15 NOTE — TELEPHONE ENCOUNTER
Left VM for patient.  Recent US of liver showed several small spots.  Although they do not appear immediately worrisome, the ultrasound cannot fully characterize them. I would recommend we consider a MRI of the liver to fully evaluate these.  I will order.  Please let me know if he has further questions/concerns.  Thanks,  Louann Villanueva MD  Internal Medicine

## 2018-02-20 ENCOUNTER — MYC MEDICAL ADVICE (OUTPATIENT)
Dept: INTERNAL MEDICINE | Facility: CLINIC | Age: 68
End: 2018-02-20

## 2018-02-20 DIAGNOSIS — I10 BENIGN ESSENTIAL HYPERTENSION: Primary | ICD-10-CM

## 2018-03-03 ENCOUNTER — RADIANT APPOINTMENT (OUTPATIENT)
Dept: MRI IMAGING | Facility: CLINIC | Age: 68
End: 2018-03-03
Attending: INTERNAL MEDICINE
Payer: MEDICARE

## 2018-03-03 DIAGNOSIS — K76.9 LIVER LESION: ICD-10-CM

## 2018-03-03 RX ORDER — GADOBUTROL 604.72 MG/ML
7.5 INJECTION INTRAVENOUS ONCE
Status: COMPLETED | OUTPATIENT
Start: 2018-03-03 | End: 2018-03-03

## 2018-03-03 RX ADMIN — GADOBUTROL 7.5 ML: 604.72 INJECTION INTRAVENOUS at 09:31

## 2018-03-09 ENCOUNTER — TELEPHONE (OUTPATIENT)
Dept: GASTROENTEROLOGY | Facility: OUTPATIENT CENTER | Age: 68
End: 2018-03-09

## 2018-03-09 NOTE — TELEPHONE ENCOUNTER
Patient taking any blood thinners ? Aspirin    Heart disease ? denies    Lung disease ?denies      Sleep apnea ?denies    Diabetic ?denies    Kidney disease ?denies    Dialysis ? n/a    Electronic implanted medical devices ?denies    Are you taking any narcotic pain medication ?  no What is your daily dosage ?    PTSD ? n/a    Prep instructions reviewed with patient ? Patient declined review.  policy, MAC sedation plan reviewed. Advised patient to have someone stay with him post exam    Pharmacy : n/a    Indication for procedure : Special screening for malignant neoplasms, colon [Z12.11    Referring provider :Louann Villanueva MD     Arrival Time : Instructed patient to arrive at 6:30 AM

## 2018-03-15 ENCOUNTER — TELEPHONE (OUTPATIENT)
Dept: INTERNAL MEDICINE | Facility: CLINIC | Age: 68
End: 2018-03-15

## 2018-03-15 RX ORDER — AMLODIPINE BESYLATE 2.5 MG/1
2.5 TABLET ORAL DAILY
Qty: 90 TABLET | Refills: 1 | Status: SHIPPED | OUTPATIENT
Start: 2018-03-15 | End: 2018-06-22

## 2018-03-19 ENCOUNTER — TRANSFERRED RECORDS (OUTPATIENT)
Dept: HEALTH INFORMATION MANAGEMENT | Facility: CLINIC | Age: 68
End: 2018-03-19

## 2018-03-19 ENCOUNTER — DOCUMENTATION ONLY (OUTPATIENT)
Dept: GASTROENTEROLOGY | Facility: OUTPATIENT CENTER | Age: 68
End: 2018-03-19
Payer: MEDICARE

## 2018-03-20 LAB — COPATH REPORT: NORMAL

## 2018-06-22 ENCOUNTER — ALLIED HEALTH/NURSE VISIT (OUTPATIENT)
Dept: INTERNAL MEDICINE | Facility: CLINIC | Age: 68
End: 2018-06-22
Payer: MEDICARE

## 2018-06-22 VITALS — DIASTOLIC BLOOD PRESSURE: 81 MMHG | SYSTOLIC BLOOD PRESSURE: 132 MMHG | HEART RATE: 69 BPM

## 2018-06-22 DIAGNOSIS — I10 BENIGN ESSENTIAL HYPERTENSION: ICD-10-CM

## 2018-06-22 RX ORDER — AMLODIPINE BESYLATE 2.5 MG/1
2.5 TABLET ORAL DAILY
Qty: 90 TABLET | Refills: 3 | Status: SHIPPED | OUTPATIENT
Start: 2018-06-22 | End: 2019-02-11

## 2018-06-22 NOTE — NURSING NOTE
Chief Complaint   Patient presents with     Hypertension     Patient is here for blood pressure check     Tay Hodges comes into clinic today at the request of Dr. Louann Villanueva for blood pressure check.    AHA 3 BP was performed per protocol using adult regular cuff size on patient's right upper arm.    1st BP: 143/83  2nd BP: 128/81  3rd BP: 124/80    AVERAGE AHA 3 BP: 132/81  Pulse:  69 bpm    This service provided today was under the direct supervision of Dr. Louann Shirley, who was available. Provider was notified of patient's AHA 3 BP. No BP medication changes. Amlodipine was refilled upon patient request by Provider today.        Avila Hilario CMA at 9:24 AM on 6/22/2018

## 2018-06-22 NOTE — MR AVS SNAPSHOT
After Visit Summary   6/22/2018    Tay Hodges    MRN: [id number]           Patient Information     Date Of Birth          1950        Visit Information        Provider Department      6/22/2018 9:00 AM Nurse, [facility]        Today's Diagnoses     Benign essential hypertension           Follow-ups after your visit        Who to contact     Please call your clinic at [phone] to:    Ask questions about your health    Make or cancel appointments    Discuss your medicines    Learn about your test results    Speak to your doctor            Additional Information About Your Visit        [email or web address] Information     [facility] gives you secure access to your electronic health record. If you see a primary care provider, you can also send messages to your care team and make appointments. If you have questions, please call your primary care clinic.  If you do not have a primary care provider, please call [phone] and they will assist you.      [employer] is an electronic gateway that provides easy, online access to your medical records. With [employer], you can request a clinic appointment, read your test results, renew a prescription or communicate with your care team.     To access your existing account, please contact your [facility] or call [phone] for assistance.        Care EveryWhere ID     This is your Care EveryWhere ID. This could be used by other organizations to access your [facility] medical records  [id number]        Your Vitals Were     Pulse                   69            Blood Pressure from Last 3 Encounters:   06/22/18 132/81   02/09/18 152/90   11/03/17 151/71    Weight from Last 3 Encounters:   02/09/18 84.6 kg (186 lb 6.4 oz)   11/03/17 84.2 kg (185 lb 11.2 oz)   02/01/17 83.5 kg (184 lb)              Today, you had the following     No orders found for display         Where to get your medicines      These medications  were sent to [id number] - [facility] [address]  [address] [place] [address]     Phone:  [phone]     amLODIPine 2.5 MG tablet          Primary Care Provider Office Phone # Fax #    Louann Villanueva [phone]       [address] [facility] [place] [address]        Equal Access to Services     ALEX PEREZ : Hadii aad ku hadasho Soomaali, waaxda luqadaha, qaybta kaalmada adeegyada, waxay idiin hayaan adeeg kimoaracarlos alberto la'joyce . So [facility] [phone].    ATENCIÓN: Si habla español, tiene a fitch disposición servicios gratuitos de asistencia lingüística. [facility] [phone].    We comply with applicable federal civil rights laws and [place] laws. We do not discriminate on the basis of race, color, national origin, age, disability, sex, sexual orientation, or gender identity.            Thank you!     Thank you for choosing [facility]  for your care. Our goal is always to provide you with excellent care. Hearing back from our patients is one way we can continue to improve our services. Please take a few minutes to complete the written survey that you may receive in the mail after your visit with us. Thank you!             Your Updated Medication List - Protect others around you: Learn how to safely use, store and throw away your medicines at www.disposemymeds.org.          This list is accurate as of 6/22/18  9:30 AM.  Always use your most recent med list.                   Brand Name Dispense Instructions for use Diagnosis    ADVIL 200 MG tablet   Generic drug:  ibuprofen      Take 200 mg by mouth every 4 hours as needed.        amLODIPine 2.5 MG tablet    NORVASC    90 tablet    Take 1 tablet (2.5 mg) by mouth daily    Benign essential hypertension       aspirin 325 MG tablet      Take 325 mg by mouth daily.        atorvastatin 80 MG tablet    LIPITOR    90 tablet    Take 1 tablet (80 mg) by mouth daily     Hyperlipidemia, unspecified hyperlipidemia type       levothyroxine 112 MCG tablet    SYNTHROID/LEVOTHROID    90 tablet    Take 1 tablet (112 mcg) by mouth daily    Hypothyroidism due to acquired atrophy of thyroid

## 2019-01-24 DIAGNOSIS — E78.5 HYPERLIPIDEMIA, UNSPECIFIED HYPERLIPIDEMIA TYPE: ICD-10-CM

## 2019-01-24 DIAGNOSIS — E03.4 HYPOTHYROIDISM DUE TO ACQUIRED ATROPHY OF THYROID: ICD-10-CM

## 2019-01-24 RX ORDER — LEVOTHYROXINE SODIUM 112 UG/1
TABLET ORAL
Qty: 90 TABLET | Refills: 0 | Status: SHIPPED | OUTPATIENT
Start: 2019-01-24 | End: 2019-02-12

## 2019-01-24 RX ORDER — ATORVASTATIN CALCIUM 80 MG/1
TABLET, FILM COATED ORAL
Qty: 90 TABLET | Refills: 0 | Status: SHIPPED | OUTPATIENT
Start: 2019-01-24 | End: 2019-02-12

## 2019-01-24 NOTE — TELEPHONE ENCOUNTER
LVD=2/9/2018, NVD=2/11/2019 PCC.  Refilled for 90 days per protocol.  Labs due at next appt. Message to staff.

## 2019-01-28 ASSESSMENT — ENCOUNTER SYMPTOMS
SKIN CHANGES: 0
HEMATURIA: 0
NAIL CHANGES: 0
DYSURIA: 0
FLANK PAIN: 0
POOR WOUND HEALING: 0
DIFFICULTY URINATING: 0

## 2019-02-11 ENCOUNTER — OFFICE VISIT (OUTPATIENT)
Dept: INTERNAL MEDICINE | Facility: CLINIC | Age: 69
End: 2019-02-11
Payer: MEDICARE

## 2019-02-11 VITALS
HEART RATE: 70 BPM | WEIGHT: 187.2 LBS | SYSTOLIC BLOOD PRESSURE: 132 MMHG | OXYGEN SATURATION: 99 % | HEIGHT: 69 IN | BODY MASS INDEX: 27.73 KG/M2 | DIASTOLIC BLOOD PRESSURE: 83 MMHG

## 2019-02-11 DIAGNOSIS — E03.4 HYPOTHYROIDISM DUE TO ACQUIRED ATROPHY OF THYROID: ICD-10-CM

## 2019-02-11 DIAGNOSIS — Z12.5 ENCOUNTER FOR SCREENING FOR MALIGNANT NEOPLASM OF PROSTATE: ICD-10-CM

## 2019-02-11 DIAGNOSIS — I10 BENIGN ESSENTIAL HYPERTENSION: ICD-10-CM

## 2019-02-11 DIAGNOSIS — R73.01 ELEVATED FASTING GLUCOSE: ICD-10-CM

## 2019-02-11 DIAGNOSIS — M19.071 PRIMARY OSTEOARTHRITIS OF RIGHT FOOT: ICD-10-CM

## 2019-02-11 DIAGNOSIS — K86.9 LESION OF PANCREAS: ICD-10-CM

## 2019-02-11 DIAGNOSIS — R39.15 URINARY URGENCY: Primary | ICD-10-CM

## 2019-02-11 DIAGNOSIS — R39.15 URINARY URGENCY: ICD-10-CM

## 2019-02-11 DIAGNOSIS — E78.5 HYPERLIPIDEMIA, UNSPECIFIED HYPERLIPIDEMIA TYPE: ICD-10-CM

## 2019-02-11 DIAGNOSIS — Z00.00 ROUTINE GENERAL MEDICAL EXAMINATION AT A HEALTH CARE FACILITY: ICD-10-CM

## 2019-02-11 LAB
ALBUMIN SERPL-MCNC: 3.9 G/DL (ref 3.4–5)
ALP SERPL-CCNC: 48 U/L (ref 40–150)
ALT SERPL W P-5'-P-CCNC: 33 U/L (ref 0–70)
ANION GAP SERPL CALCULATED.3IONS-SCNC: 3 MMOL/L (ref 3–14)
AST SERPL W P-5'-P-CCNC: 22 U/L (ref 0–45)
BILIRUB SERPL-MCNC: 0.8 MG/DL (ref 0.2–1.3)
BUN SERPL-MCNC: 20 MG/DL (ref 7–30)
CALCIUM SERPL-MCNC: 8.8 MG/DL (ref 8.5–10.1)
CHLORIDE SERPL-SCNC: 104 MMOL/L (ref 94–109)
CHOLEST SERPL-MCNC: 138 MG/DL
CO2 SERPL-SCNC: 32 MMOL/L (ref 20–32)
CREAT SERPL-MCNC: 0.86 MG/DL (ref 0.66–1.25)
GFR SERPL CREATININE-BSD FRML MDRD: 90 ML/MIN/{1.73_M2}
GLUCOSE SERPL-MCNC: 101 MG/DL (ref 70–99)
HDLC SERPL-MCNC: 57 MG/DL
LDLC SERPL CALC-MCNC: 70 MG/DL
NONHDLC SERPL-MCNC: 81 MG/DL
POTASSIUM SERPL-SCNC: 4 MMOL/L (ref 3.4–5.3)
PROT SERPL-MCNC: 7.3 G/DL (ref 6.8–8.8)
PSA SERPL-ACNC: 2.66 UG/L (ref 0–4)
SODIUM SERPL-SCNC: 138 MMOL/L (ref 133–144)
TRIGL SERPL-MCNC: 57 MG/DL
TSH SERPL DL<=0.005 MIU/L-ACNC: 0.89 MU/L (ref 0.4–4)

## 2019-02-11 RX ORDER — ASPIRIN 325 MG
325 TABLET ORAL DAILY
Status: CANCELLED | OUTPATIENT
Start: 2019-02-11

## 2019-02-11 RX ORDER — ATORVASTATIN CALCIUM 80 MG/1
TABLET, FILM COATED ORAL
Qty: 90 TABLET | Refills: 0 | Status: CANCELLED | OUTPATIENT
Start: 2019-02-11

## 2019-02-11 RX ORDER — LEVOTHYROXINE SODIUM 112 UG/1
TABLET ORAL
Qty: 90 TABLET | Refills: 0 | Status: CANCELLED | OUTPATIENT
Start: 2019-02-11

## 2019-02-11 RX ORDER — AMLODIPINE BESYLATE 2.5 MG/1
2.5 TABLET ORAL DAILY
Qty: 90 TABLET | Refills: 3 | Status: SHIPPED | OUTPATIENT
Start: 2019-02-11 | End: 2020-01-20

## 2019-02-11 RX ORDER — IBUPROFEN 200 MG
200 TABLET ORAL EVERY 4 HOURS PRN
Qty: 100 TABLET | Status: CANCELLED | OUTPATIENT
Start: 2019-02-11

## 2019-02-11 ASSESSMENT — MIFFLIN-ST. JEOR: SCORE: 1609.76

## 2019-02-11 NOTE — PROGRESS NOTES
[facility]   Louann Villanueva MD  02/11/2019      Chief Complaint: Physical     History of Present Illness:   Tay Hodges is a 69 year old male with a history of coronary atherosclerosis of native coronary artery, hyperlipidemia, and elevated BP who presents for annual physical exam.    Urinary concerns:  He reports a gradual increase in his nocturia over the last 4-5 years. He is up to 1-2 times per evening and sometimes has to rush to make it to the bathroom. This is worsened by alcohol, caffeine, and cold weather. If he is out in the cold he can last only 1 hour without using the bathroom. He frequently has urinary urgency, especially when he opens the door arriving home and when filling the humidifier with water. His stream is not as strong as it used to be. He would consider taking a medication for this if the side effects were mild. He is managing his symptoms with frequent bathroom visits. He denies trouble initiating or stopping urination.     Blood pressure:  The patient is currently taking Amlodipine 2.5 mg daily for elevated blood pressure. He reports compliance with this. His BP upon arrival is 132/83. He reports some nervousness while in the clinic. He would like to lose a couple of pounds that he put on over the holiday season. The patient exercises for 15 minutes daily in the winter. He is outside walking for an hour in the warmer weather. He does not have a BP cuff at home. He denies trouble breathing or chest pain.     Liver cyst:  The patient's abdominal MRI result from 3/3/18 are listed below. They had an incidental finding of pancreas side branch IPMN. He is amenable to serial MRI surveillance of this.     IMPRESSION:  1. Non-cirrhotic configuration. Scattered benign appearing liver  lesions such as liver cysts and a small benign hemangiomas. No  suspicious liver lesions.  2. Tiny cystic pancreatic foci without worrisome features, likely  representing tiny side  edison IPMNs. Follow-up criteria below    Headaches:  He reports that his headaches are now managed by neck exercises and adjusting the height of his laptop. He also stopped drinking a glass of alcohol before dinner as he think his headaches were possibly due to some dehydration. He is no longer having them.    Other concerns discussed:  The patient reports that he has a bump on his foot that he would like evaluated. It first showed up this summer. It is only painful with pressure and wearing tight shoes. The discomfort remains steady rather than waning and waxing.      Review of Systems:   Pertinent items are noted in HPI or as in patient entered ROS below, remainder of complete ROS is negative.   Answers for HPI/ROS submitted by the patient on 1/28/2019   General Symptoms: No  Skin Symptoms: Yes  HENT Symptoms: No  EYE SYMPTOMS: No  HEART SYMPTOMS: No  LUNG SYMPTOMS: No  INTESTINAL SYMPTOMS: No  URINARY SYMPTOMS: Yes  REPRODUCTIVE SYMPTOMS: No  SKELETAL SYMPTOMS: No  BLOOD SYMPTOMS: No  NERVOUS SYSTEM SYMPTOMS: No  MENTAL HEALTH SYMPTOMS: No  Changes in hair: No  Changes in moles/birth marks: No  Itching: No  Rashes: No  Changes in nails: No  Acne: No  Change in facial hair: No  Warts: No  Non-healing sores: No  Scarring: No  Flaking of skin: No  Color changes of hands/feet in cold : No  Sun sensitivity: No  Skin thickening: No  Trouble holding urine or incontinence: Yes  Pain or burning: No  Trouble starting or stopping: No  Increased frequency of urination: Yes  Blood in urine: No  Decreased frequency of urination: No  Frequent nighttime urination: Yes  Flank pain: No  Difficulty emptying bladder: No    Active Medications:      amLODIPine (NORVASC) 2.5 MG tablet, Take 1 tablet (2.5 mg) by mouth daily, Disp: 90 tablet, Rfl: 3     aspirin 325 MG tablet, Take 325 mg by mouth daily., Disp: , Rfl:      atorvastatin (LIPITOR) 80 MG tablet, TAKE 1 TABLET(80 MG) BY MOUTH DAILY, Disp: 90 tablet, Rfl: 0     ibuprofen  "(ADVIL) 200 MG tablet, Take 200 mg by mouth every 4 hours as needed., Disp: , Rfl:      levothyroxine (SYNTHROID/LEVOTHROID) 112 MCG tablet, TAKE 1 TABLET(112 MCG) BY MOUTH DAILY, Disp: 90 tablet, Rfl: 0      Allergies: Patient has no known allergies.      Past Medical History:  Hyperlipidemia  Nephrolithiasis  Lip lesion   Coronary atherosclerosis of native coronary artery  Hypothyroidism due to acquired atrophy of thyroid     Past Surgical History:  Lithotripsy  Partial thyroidectomy    Family History:   Father - lung cancer  Mother - Alzheimer disease  Maternal grandmother - coronary artery disease  Sister - melanoma     Social History:   Presents to clinic alone.   Tobacco Use: No previous or current tobacco use.   Alcohol Use: Yes, 10  The patient is retired. He spends time travelling, spending time with his kids, reading, walking, and volunteering with a finance board.      Physical Exam:   /83   Pulse 70   Ht 1.745 m (5' 8.7\")   Wt 84.9 kg (187 lb 3.2 oz)   SpO2 99%   BMI 27.89 kg/m       Constitutional: Alert, oriented, pleasant, no acute distress  Head: Normocephalic, atraumatic  Eyes: Extra-ocular movements intact, no scleral icterus  ENT: Oropharynx clear, moist mucus membranes, good dentition  Neck: Supple, no lymphadenopathy, no thyromegaly   Cardiovascular: Regular rate and rhythm, no murmurs, rubs or gallops, peripheral pulses full/symmetric  Respiratory: Good air movement bilaterally, lungs clear, no wheezes/rales/rhonchi  GI: Abdomen soft, bowel sounds present, nondistended, nontender, no organomegaly or masses, no rebound/guarding  Musculoskeletal: Right MTP joint with mild bony enlargement. No erythema or warmth. No edema, normal muscle tone, normal gait  Neurologic: Alert and oriented, cranial nerves 2-12 intact.  Skin: Several melanocytic nevi on trunk.   Psychiatric: normal mentation, affect and mood     Assessment and Plan:  Routine general medical examination at a [facility]" facility - Health maintenance review and updated as indicated.     Hypothyroidism due to acquired atrophy of thyroid  - TSH    Hyperlipidemia, unspecified hyperlipidemia type  - Lipid panel reflex to direct LDL Fasting    Benign essential hypertension - Improved on low dose amlodipine, though his blood pressures remain slightly elevated. I encouraged him to purchase a home monitor for surveillance.   - amLODIPine (NORVASC) 2.5 MG tablet  Dispense: 90 tablet; Refill: 3  - Comprehensive metabolic panel  - DME REFERRAL  - order for DME  Dispense: 1 Units; Refill: 0    Urinary urgency - Seems most consistent with overactive bladder. He declines medications at this time.   - PSA screen    Lesion of pancreas - Diminutive IPMN lesions on last MRI. We discussed the malignant potential and he agrees to further surveillance.   - MRI Abdomen MRCP w/o & w contrast    Encounter for screening for malignant neoplasm of prostate   - PSA screen    Primary osteoarthritis of right foot - Discussed conservative management. He declines further intervention at this time.        Immunizations (zoster, pneumovax, flu, Tdap, Hep A/B):   Most Recent Immunizations   Administered Date(s) Administered     Flu 65+ Years 11/08/2018     Influenza (High Dose) 3 valent vaccine 02/01/2017     Influenza (IIV3) PF 01/20/2016     Pneumo Conj 13-V (2010&after) 02/01/2017     Pneumococcal 23 valent 02/09/2018     TD (ADULT, 7+) 07/01/2004     TDAP Vaccine (Boostrix) 12/19/2012     Zoster vaccine, live 12/19/2012        Lipids:           Recent Labs   Lab Test  02/09/18   0950  02/01/17   0919    01/14/15   1056  12/19/12   0957   CHOL  138  134   < >  149  151   HDL  58  58   < >  67  55   LDL  73  66   < >  74  88   TRIG  41  48   < >  40  38   CHOLHDLRATIO   --    --    --   2.2  2.7    < > = values in this interval not displayed.      PSA (50-75 yrs):           PSA   Date Value Ref Range Status   02/01/2017 2.25 0 - 4 ug/L Final       Comment:        Assay Method:  Chemiluminescence using Siemens Vista analyzer      AAA Screening (65-75 yrs): n/a  Lung Ca Screening (>30 pk age 55-79 or >20 py age 50-79 + RF): n/a  Colonoscopy (50-75 yrs): 3/18 several large TAs, rec 3 years follow up  Dexa (>65W or 70M yrs): n/a  HIV/HCV if risk factors: HCV neg 2017  Safety/Lifestyle: reviewed  Tob/EtOH: reviewed  Depression: screen neg  Advanced Directive:  deferred          Scribe Disclosure:   I, Bianka Chase, am serving as a scribe to document services personally performed by Louann Villanueva MD at this visit, based upon the provider's statements to me. All documentation has been reviewed by the aforementioned provider prior to being entered into the official medical record.     Portions of this medical record were completed by a scribe. UPON MY REVIEW AND AUTHENTICATION BY ELECTRONIC SIGNATURE, this confirms (a) I performed the applicable clinical services, and (b) the record is accurate.   Louann Villanueva MD  Internal Medicine

## 2019-02-11 NOTE — PATIENT INSTRUCTIONS
Primary Care Center Phone Number: [phone]   [facility] Medication Refill Request Information:  * Please contact your pharmacy regarding ANY request for medication refills.  ** [facility] Prescription Fax = [phone]  * Please allow 3 business days for routine medication refills.  * Please allow 5 business days for controlled substance medication refills.     Primary Care Center Test Result notification information:  *You will be notified with in 7-10 days of your appointment day regarding the results of your test.  If you are on MyChart you will be notified as soon as the provider has reviewed the results and signed off on them.      Dear patient,    Your health care provider has recommended that you receive the new shingles vaccine  called Shingrix to prevent shingles. Many private pay insurance and Medicare Part D cover Shingrix. However, not all insurance carriers cover the entire cost of the Shingrix vaccine if the vaccine is administered at your primary care clinic.    Prior to receiving the vaccine, we recommend that you call your insurance carrier and  ask them the following questions:    1. Does my insurance cover the Shingrix vaccine and the administration of the  vaccine?  2. What is my co-pay or deductible for the vaccine?  3. Is there a cost difference if I receive the vaccine at my doctor s office or a  Pharmacy?    The clinic cannot determine your insurance benefits. Please call your insurance provider  prior to scheduling an appointment to receive the Shingrix vaccine. If you decide to  receive your vaccine at the Primary Care Center, please call [phone] and  request a nurse-only appointment for the Shingrix vaccine. The vaccine comes in two doses. Your second dose should be 2-6 months after your first Shingrix injections.  Nurse visit hours are available Monday, Wednesday, and Friday from 9-11:00 AM and 1:00-3:00PM.    Sincerely,    The Primary Care Team

## 2019-02-11 NOTE — NURSING NOTE
Chief Complaint   Patient presents with     Physical     Pt is here for a physical       Tyrel Ocampo EMT at 8:25 AM on 2/11/2019

## 2019-02-11 NOTE — PROGRESS NOTES
Immunizations (zoster, pneumovax, flu, Tdap, Hep A/B):   Most Recent Immunizations   Administered Date(s) Administered     Flu 65+ Years 11/08/2018     Influenza (High Dose) 3 valent vaccine 02/01/2017     Influenza (IIV3) PF 01/20/2016     Pneumo Conj 13-V (2010&after) 02/01/2017     Pneumococcal 23 valent 02/09/2018     TD (ADULT, 7+) 07/01/2004     TDAP Vaccine (Boostrix) 12/19/2012     Zoster vaccine, live 12/19/2012        Lipids:           Recent Labs   Lab Test  02/09/18   0950  02/01/17   0919    01/14/15   1056  12/19/12   0957   CHOL  138  134   < >  149  151   HDL  58  58   < >  67  55   LDL  73  66   < >  74  88   TRIG  41  48   < >  40  38   CHOLHDLRATIO   --    --    --   2.2  2.7    < > = values in this interval not displayed.      PSA (50-75 yrs):           PSA   Date Value Ref Range Status   02/01/2017 2.25 0 - 4 ug/L Final       Comment:       Assay Method:  Chemiluminescence using Siemens Vista analyzer      AAA Screening (65-75 yrs): n/a  Lung Ca Screening (>30 pk age 55-79 or >20 py age 50-79 + RF): n/a  Colonoscopy (50-75 yrs): 3/18 several large TAs, rec 3 years follow up  Dexa (>65W or 70M yrs): n/a  HIV/HCV if risk factors: HCV neg 2017  Safety/Lifestyle: reviewed  Tob/EtOH: reviewed  Depression: screen neg  Advanced Directive:  deferred

## 2019-02-12 RX ORDER — ATORVASTATIN CALCIUM 80 MG/1
80 TABLET, FILM COATED ORAL DAILY
Qty: 90 TABLET | Refills: 3 | Status: SHIPPED | OUTPATIENT
Start: 2019-02-12 | End: 2020-02-14

## 2019-02-12 RX ORDER — LEVOTHYROXINE SODIUM 112 UG/1
112 TABLET ORAL DAILY
Qty: 90 TABLET | Refills: 3 | Status: SHIPPED | OUTPATIENT
Start: 2019-02-12 | End: 2020-02-14

## 2019-03-21 ENCOUNTER — ANCILLARY PROCEDURE (OUTPATIENT)
Dept: MRI IMAGING | Facility: CLINIC | Age: 69
End: 2019-03-21
Attending: INTERNAL MEDICINE
Payer: MEDICARE

## 2019-03-21 DIAGNOSIS — K86.9 LESION OF PANCREAS: ICD-10-CM

## 2019-03-21 RX ORDER — GADOBUTROL 604.72 MG/ML
10 INJECTION INTRAVENOUS ONCE
Status: COMPLETED | OUTPATIENT
Start: 2019-03-21 | End: 2019-03-21

## 2019-03-21 RX ADMIN — GADOBUTROL 8.5 ML: 604.72 INJECTION INTRAVENOUS at 08:44

## 2019-03-21 NOTE — DISCHARGE INSTRUCTIONS
MRI Contrast Discharge Instructions    The IV contrast you received today will pass out of your body in your  urine. This will happen in the next 24 hours. You will not feel this process.  Your urine will not change color.    Drink at least 4 extra glasses of water or juice today (unless your doctor  has restricted your fluids). This reduces the stress on your kidneys.  You may take your regular medicines.    If you are on dialysis: It is best to have dialysis today.    If you have a reaction: Most reactions happen right away. If you have  any new symptoms after leaving the hospital (such as hives or swelling),  call your hospital at the correct number below. Or call your family doctor.  If you have breathing distress or wheezing, call 911.    Special instructions: ***    I have read and understand the above information.    Signature:______________________________________ Date:___________    Staff:__________________________________________ Date:___________     Time:__________    [place] [facility] Departments:    ___[place]: [phone]  ___[facility]: [phone]  ___[place]: [phone] ___[facility]: [phone]  ___[facility]: [phone]  ___[facility]: [phone]  ___[phone]  ___[facility]: [phone]  ___[phone]

## 2019-03-21 NOTE — LETTER
Patient:  Tay Hodges  :   1950  MRN:     [id number]        Mr. 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  We appreciate the opportunity to serve you.    Your recent MRI was stable/unchanged.  You have no worrisome lesions at this time.  I think we can delay further monitoring of the small pancreatic cysts for 2-3 years.  Please let me know if you develop any new or concerning symptoms.    Results for orders placed or performed in visit on 19   MRI Abdomen MRCP w/o & w contrast    Narrative    MRCP Without and With Contrast    CLINICAL HISTORY: Pancreatic cyst/pseudo cyst, follow up; Lesion of  pancreas    DATE: 3/21/2019 9:50 AM    TECHNIQUE:     Images were acquired with and without intravenous gadolinium contrast  through the upper abdomen. The following MR images were acquired  without intravenous contrast: TrueFISP, multiplanar T2-weighted, axial  T1 in/out of phase, T2-weighted MRCP images, axial diffusion-weighted  and axial apparent diffusion coefficient. T1-weighted images were  obtained before contrast at the multiple time points following  contrast injection. 3-D reformatted images were generated by the  technologist. Contrast dose: 8.5mL Gadavist    Comparison study: MR abdomen 3/3/2018, 2007    FINDINGS:    Biliary Tree: No intrahepatic or extra hepatic biliary dilation.    Pancreas: Few tiny cystic foci in the pancreas, for example measuring  2 mm and 3 mm in the proximal body. No pancreatic ductal dilation or  abnormal enhancement. Normal T1 signal intensity. Dominant dorsal  pancreatic duct.    Liver: There are 8 mm T2 hyperintense lesions laterally in segment 5  and segment 2, which progressively fill in on the postcontrast images  until they are homogeneous to the background liver. Simple  hepatic  cyst in segment 4A measuring up to 1.4 cm.    Gallbladder: Normal    Spleen: Normal    Kidneys: Bilateral simple renal cortical cysts and renal sinus cysts  in similar size and configuration.    Adrenal glands: Normal    Bowel: Normal caliber    Lymph nodes: No enlarged lymph nodes.     Blood vessels: Portal vein, superior mesenteric vein, and splenic vein  are patent. No aortic aneurysm.    Lung bases: Clear    Bones and soft tissues: No suspicious osseous lesion.    Mesentery and abdominal wall: Unremarkable    Ascites: None      Impression    IMPRESSION:   1. Unchanged tiny cystic pancreatic lesions, measuring up to 3 mm.  These are likely side branch intraductal pancreatic mucinous  neoplasms. No suspicious features on today's exam. Follow-up criteria  below.  2. Unchanged simple cysts in the kidneys and liver. Small hepatic  hemangiomas are also unchanged.    [facility] recommendations for asymptomatic pancreatic  cysts, modified from international consensus guidelines*   Size of largest cyst:   Less than 1 cm: Follow-up imaging in 6 months to 1 year, then lengthen  interval to 2-3 years if no change.  1-2 cm: Follow-up imaging at 6 months, then yearly for 2 years, then  lengthen interval if no change.   The optimal initial study or first follow-up exam is MRI/MRCP  performed with intravenous gadolinium contrast to allow full cyst  characterization. Once characterized, contrast is optional on  follow-up MRIs. If MRI is contraindicated, CT with contrast is  recommended.   GI consultation for possible endoscopic ultrasound is recommended for  cysts with the following features: Size > 2 cm, >20% growth on  followup, wall thickening or enhancement, mural nodule, duct > 5 mm,  or abrupt change in duct caliber with distal atrophy. GI or surgical  consultation is also recommended for symptomatic cysts.   *Reference: International Consensus Guidelines for Management of IPMN  and MCN of the  pancreas. Pancreatology: 12:(2012); 183-197.    I have personally reviewed the examination and initial interpretation  and I agree with the findings.    YVES BLOOD MD       Please contact your provider if you have any questions or concerns.  We look forward to serving your needs in the future.    Sincerely,  Louann Villanueva MD  Internal Medicine

## 2019-04-22 DIAGNOSIS — E78.5 HYPERLIPIDEMIA, UNSPECIFIED HYPERLIPIDEMIA TYPE: ICD-10-CM

## 2019-04-22 RX ORDER — ATORVASTATIN CALCIUM 80 MG/1
TABLET, FILM COATED ORAL
Qty: 90 TABLET | Refills: 0 | OUTPATIENT
Start: 2019-04-22

## 2019-04-22 NOTE — TELEPHONE ENCOUNTER
Duplicate. See below:    atorvastatin (LIPITOR) 80 MG tablet 90 tablet 3 2/12/2019  No   Sig - Route: Take 1 tablet (80 mg) by mouth daily - Oral   Sent to pharmacy as: atorvastatin (LIPITOR) 80 MG tablet   Class: E-Prescribe   Order: [id number]   E-Prescribing Status: Receipt confirmed by pharmacy (2/12/2019  2:06 PM CST)       Printed order and faxed to pharmacy.

## 2019-09-24 NOTE — TELEPHONE ENCOUNTER
RECORDS RECEIVED FROM: Large bump on base of big toe on right foot, painful when pressure applied. Symptoms for 1.5 yrs. No prev surgery/imaging, self referring. Okay to schedule per Mally in ortho.   DATE RECEIVED: 9/24   NOTES STATUS DETAILS   OFFICE NOTE from referring provider N/A    OFFICE NOTE from other specialist N/A    DISCHARGE SUMMARY from hospital N/A    DISCHARGE REPORT from the ER N/A    OPERATIVE REPORT N/A    MEDICATION LIST Internal    MRI N/A    CT SCAN N/A    XRAYS (IMAGES & REPORTS) N/A

## 2019-09-30 DIAGNOSIS — M79.671 RIGHT FOOT PAIN: Primary | ICD-10-CM

## 2019-10-01 ENCOUNTER — ANCILLARY PROCEDURE (OUTPATIENT)
Dept: GENERAL RADIOLOGY | Facility: CLINIC | Age: 69
End: 2019-10-01
Attending: FAMILY MEDICINE
Payer: MEDICARE

## 2019-10-01 ENCOUNTER — OFFICE VISIT (OUTPATIENT)
Dept: ORTHOPEDICS | Facility: CLINIC | Age: 69
End: 2019-10-01
Payer: MEDICARE

## 2019-10-01 ENCOUNTER — PRE VISIT (OUTPATIENT)
Dept: ORTHOPEDICS | Facility: CLINIC | Age: 69
End: 2019-10-01

## 2019-10-01 VITALS — BODY MASS INDEX: 26.36 KG/M2 | HEIGHT: 69 IN | WEIGHT: 178 LBS

## 2019-10-01 DIAGNOSIS — M19.042 PRIMARY OSTEOARTHRITIS OF LEFT HAND: ICD-10-CM

## 2019-10-01 DIAGNOSIS — M19.071 PRIMARY OSTEOARTHRITIS OF RIGHT FOOT: ICD-10-CM

## 2019-10-01 DIAGNOSIS — M79.642 LEFT HAND PAIN: Primary | ICD-10-CM

## 2019-10-01 DIAGNOSIS — M79.642 LEFT HAND PAIN: ICD-10-CM

## 2019-10-01 DIAGNOSIS — M79.671 RIGHT FOOT PAIN: ICD-10-CM

## 2019-10-01 ASSESSMENT — MIFFLIN-ST. JEOR: SCORE: 1572.78

## 2019-10-01 NOTE — PROGRESS NOTES
"Sports Medicine Clinic Visit    PCP: Louann Villanueva Carroll is a 69 year old male who is seen  as self referral presenting with a bump on the right foot MTP. He states that he has a dull pain in the MTP great toe, but overall it is not painful on a daily basis    Additionally, he injured his left middle finger about 10 days ago and would like it looked at.     Injury: None    Location of Pain: right foot  Duration of Pain: 18 month(s)  Pain is better with: Nothing  Pain is worse with: Tight shoes  Additional Features:   Treatment so far consists of: Nothing  Prior History of related problems:     Ht 1.753 m (5' 9\")   Wt 80.7 kg (178 lb)   BMI 26.29 kg/m           PMH:  Past Medical History:   Diagnosis Date     Benign liver cyst     hemangioma 3/18, no f/u indicated     Colon polyps     colo due again 3 years (2021)     Coronary atherosclerosis of native coronary artery 12/19/2012    Mild disease seen on angiogram     Hypertension      Nephrolithiasis 12/13/2011     Other and unspecified hyperlipidemia 12/13/2011     Pancreas cyst      Unspecified hypothyroidism 12/13/2011       Active problem list:  Patient Active Problem List   Diagnosis     Hypothyroidism     Hyperlipidemia     Nephrolithiasis     Lip lesion     Coronary atherosclerosis of native coronary artery     Hypothyroidism due to acquired atrophy of thyroid     Hyperlipidemia, unspecified hyperlipidemia       FH:  Family History   Problem Relation Age of Onset     Cancer Other      Cancer Other      Cancer Father         Lung cancer     Alzheimer Disease Mother      C.A.D. Maternal Grandfather        SH:  Social History     Socioeconomic History     Marital status:      Spouse name: Not on file     Number of children: Not on file     Years of education: Not on file     Highest education level: Not on file   Occupational History     Not on file   Social Needs     Financial resource strain: Not on file     Food insecurity:     " Worry: Not on file     Inability: Not on file     Transportation needs:     Medical: Not on file     Non-medical: Not on file   Tobacco Use     Smoking status: Never Smoker     Smokeless tobacco: Never Used   Substance and Sexual Activity     Alcohol use: Yes     Comment: 10     Drug use: Not on file     Sexual activity: Not on file   Lifestyle     Physical activity:     Days per week: Not on file     Minutes per session: Not on file     Stress: Not on file   Relationships     Social connections:     Talks on phone: Not on file     Gets together: Not on file     Attends Yazidi service: Not on file     Active member of club or organization: Not on file     Attends meetings of clubs or organizations: Not on file     Relationship status: Not on file     Intimate partner violence:     Fear of current or ex partner: Not on file     Emotionally abused: Not on file     Physically abused: Not on file     Forced sexual activity: Not on file   Other Topics Concern     Not on file   Social History Narrative    Retired last summer 2017 as [employer], previously [employer] and  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 - and that is how they met        3 kids, one [facility], one [place], one daughter in [place] is physician    Does 15 min exercise daily       MEDS:  See EMR, reviewed  ALL:  See EMR, reviewed    REVIEW OF SYSTEMS:  CONSTITUTIONAL:NEGATIVE for fever, chills, change in weight  INTEGUMENTARY/SKIN: NEGATIVE for worrisome rashes, moles or lesions  EYES: NEGATIVE for vision changes or irritation  ENT/MOUTH: NEGATIVE for ear, mouth and throat problems  RESP:NEGATIVE for significant cough or SOB  BREAST: NEGATIVE for masses, tenderness or discharge  CV: NEGATIVE for chest pain, palpitations or  peripheral edema  GI: NEGATIVE for nausea, abdominal pain, heartburn, or change in bowel habits  :NEGATIVE for frequency, dysuria, or hematuria  :NEGATIVE for frequency, dysuria, or hematuria  NEURO: NEGATIVE for weakness, dizziness or paresthesias  ENDOCRINE: NEGATIVE for temperature intolerance, skin/hair changes  HEME/ALLERGY/IMMUNE: NEGATIVE for bleeding problems  PSYCHIATRIC: NEGATIVE for changes in mood or affect      Subjective: 69-year-old male has been noticing discomfort near the left third finger PIP joint.  He believes it has been bothered by using his left finger to turn the steering wheel when he drives.  He can think of no other injury.  The joint is not been swollen.  He is been noticing some prominence involving the MTP joint of his right foot.  It is not associated with pain.  This happened slowly over time.    Objective his right toe shows osteoarthritic changes with a mild bunion deformity that is nontender to the touch.  He has a hallux rigidus.  He is nontender over the sesamoid bones.  Nontender in the area of Lisfranc.  There is no warmth to the joint.  Overlying skin is normal.  He is nontender to the touch.  The left hand PIP joint appears normal.  There is no synovial thickening or bogginess.  He can make a full fist.  He is mildly tender over the ulnar aspect of the PIP joint and compression of the joint is with some mild discomfort.  Strength is intact independently to flexion and extension at the DIP, PIP and MCP with no weakness or lag noted.  Overlying skin is intact.  Sensation is normal.  Appropriate in conversation and affect.    An x-ray of the right great toe shows severe MTP DJD with a mild bunion deformity.  X-ray of the left hand shows mild CMC DJD and scattered small joints osteoarthritic changes of a mild degree    Assessment osteoarthritis of the right great toe.  Hallux rigidus right great toe.  We discussed other options for pain control osteoarthritis of the  "hands    Plan: We discussed supportive shoe.  If he were to needed in the future, such as a custom made rigid insert or the possibility of surgery for the MTP joint.  He declined these needs at this time.  We discussed \"joint saving devices\" such as thicker grafts and power tools for daily use.  We discussed paraffin wax treatments.  We discussed over-the-counter pain medicine such as Tylenol and nonsteroidal anti-days as options for pain relief.  He had no further questions and will follow-up as needed.     This note was created with the use of Dragon software and unintentional spelling or errors may have occurred.                      "

## 2019-10-01 NOTE — LETTER
"  10/1/2019      RE: Tay Hodges  [address] [facility] [id number]       Sports Medicine Clinic Visit    PCP: Louann Villanueva    Tay Hodges is a 69 year old male who is seen  as self referral presenting with a bump on the right foot MTP. He states that he has a dull pain in the MTP great toe, but overall it is not painful on a daily basis    Additionally, he injured his left middle finger about 10 days ago and would like it looked at.     Injury: None    Location of Pain: right foot  Duration of Pain: 18 month(s)  Pain is better with: Nothing  Pain is worse with: Tight shoes  Additional Features:   Treatment so far consists of: Nothing  Prior History of related problems:     Ht 1.753 m (5' 9\")   Wt 80.7 kg (178 lb)   BMI 26.29 kg/m            PMH:  Past Medical History:   Diagnosis Date     Benign liver cyst     hemangioma 3/18, no f/u indicated     Colon polyps     colo due again 3 years (2021)     Coronary atherosclerosis of native coronary artery 12/19/2012    Mild disease seen on angiogram     Hypertension      Nephrolithiasis 12/13/2011     Other and unspecified hyperlipidemia 12/13/2011     Pancreas cyst      Unspecified hypothyroidism 12/13/2011       Active problem list:  Patient Active Problem List   Diagnosis     Hypothyroidism     Hyperlipidemia     Nephrolithiasis     Lip lesion     Coronary atherosclerosis of native coronary artery     Hypothyroidism due to acquired atrophy of thyroid     Hyperlipidemia, unspecified hyperlipidemia       FH:  Family History   Problem Relation Age of Onset     Cancer Other      Cancer Other      Cancer Father         Lung cancer     Alzheimer Disease Mother      C.A.D. Maternal Grandfather        SH:  Social History     Socioeconomic History     Marital status:      Spouse name: Not on file     Number of children: Not on file     Years of education: Not on file     Highest education level: Not on file   Occupational History     Not " on file   Social Needs     Financial resource strain: Not on file     Food insecurity:     Worry: Not on file     Inability: Not on file     Transportation needs:     Medical: Not on file     Non-medical: Not on file   Tobacco Use     Smoking status: Never Smoker     Smokeless tobacco: Never Used   Substance and Sexual Activity     Alcohol use: Yes     Comment: 10     Drug use: Not on file     Sexual activity: Not on file   Lifestyle     Physical activity:     Days per week: Not on file     Minutes per session: Not on file     Stress: Not on file   Relationships     Social connections:     Talks on phone: Not on file     Gets together: Not on file     Attends Advent service: Not on file     Active member of club or organization: Not on file     Attends meetings of clubs or organizations: Not on file     Relationship status: Not on file     Intimate partner violence:     Fear of current or ex partner: Not on file     Emotionally abused: Not on file     Physically abused: Not on file     Forced sexual activity: Not on file   Other Topics Concern     Not on file   Social History Narrative    Retired last summer 2017 as [employer] of [employer], previously [employer] and  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 - and that is how they met        3 kids, one [facility], one [place], one daughter in [place] is physician    Does 15 min exercise daily       MEDS:  See EMR, reviewed  ALL:  See EMR, reviewed    REVIEW OF SYSTEMS:  CONSTITUTIONAL:NEGATIVE for fever, chills, change in weight  INTEGUMENTARY/SKIN: NEGATIVE for worrisome rashes, moles or lesions  EYES: NEGATIVE for vision changes or irritation  ENT/MOUTH: NEGATIVE for ear, mouth and throat problems  RESP:NEGATIVE for significant cough or SOB  BREAST:  NEGATIVE for masses, tenderness or discharge  CV: NEGATIVE for chest pain, palpitations or peripheral edema  GI: NEGATIVE for nausea, abdominal pain, heartburn, or change in bowel habits  :NEGATIVE for frequency, dysuria, or hematuria  :NEGATIVE for frequency, dysuria, or hematuria  NEURO: NEGATIVE for weakness, dizziness or paresthesias  ENDOCRINE: NEGATIVE for temperature intolerance, skin/hair changes  HEME/ALLERGY/IMMUNE: NEGATIVE for bleeding problems  PSYCHIATRIC: NEGATIVE for changes in mood or affect      Subjective: 69-year-old male has been noticing discomfort near the left third finger PIP joint.  He believes it has been bothered by using his left finger to turn the steering wheel when he drives.  He can think of no other injury.  The joint is not been swollen.  He is been noticing some prominence involving the MTP joint of his right foot.  It is not associated with pain.  This happened slowly over time.    Objective his right toe shows osteoarthritic changes with a mild bunion deformity that is nontender to the touch.  He has a hallux rigidus.  He is nontender over the sesamoid bones.  Nontender in the area of Lisfranc.  There is no warmth to the joint.  Overlying skin is normal.  He is nontender to the touch.  The left hand PIP joint appears normal.  There is no synovial thickening or bogginess.  He can make a full fist.  He is mildly tender over the ulnar aspect of the PIP joint and compression of the joint is with some mild discomfort.  Strength is intact independently to flexion and extension at the DIP, PIP and MCP with no weakness or lag noted.  Overlying skin is intact.  Sensation is normal.  Appropriate in conversation and affect.    An x-ray of the right great toe shows severe MTP DJD with a mild bunion deformity.  X-ray of the left hand shows mild CMC DJD and scattered small joints osteoarthritic changes of a mild degree    Assessment osteoarthritis of the right great toe.  Hallux rigidus  "right great toe.  We discussed other options for pain control osteoarthritis of the hands    Plan: We discussed supportive shoe.  If he were to needed in the future, such as a custom made rigid insert or the possibility of surgery for the MTP joint.  He declined these needs at this time.  We discussed \"joint saving devices\" such as thicker grafts and power tools for daily use.  We discussed paraffin wax treatments.  We discussed over-the-counter pain medicine such as Tylenol and nonsteroidal anti-days as options for pain relief.  He had no further questions and will follow-up as needed.     This note was created with the use of Dragon software and unintentional spelling or errors may have occurred.      Angelito Boles MD    "

## 2019-10-04 ENCOUNTER — HEALTH MAINTENANCE LETTER (OUTPATIENT)
Age: 69
End: 2019-10-04

## 2019-12-17 ENCOUNTER — TELEPHONE (OUTPATIENT)
Dept: INTERNAL MEDICINE | Facility: CLINIC | Age: 69
End: 2019-12-17

## 2019-12-17 ENCOUNTER — OFFICE VISIT (OUTPATIENT)
Dept: INTERNAL MEDICINE | Facility: CLINIC | Age: 69
End: 2019-12-17
Payer: MEDICARE

## 2019-12-17 VITALS
DIASTOLIC BLOOD PRESSURE: 92 MMHG | SYSTOLIC BLOOD PRESSURE: 146 MMHG | OXYGEN SATURATION: 99 % | BODY MASS INDEX: 27.38 KG/M2 | HEART RATE: 74 BPM | WEIGHT: 185.4 LBS

## 2019-12-17 DIAGNOSIS — S39.012A BACK STRAIN, INITIAL ENCOUNTER: Primary | ICD-10-CM

## 2019-12-17 RX ORDER — TIZANIDINE 2 MG/1
2-4 TABLET ORAL 3 TIMES DAILY PRN
Qty: 21 TABLET | Refills: 0 | Status: SHIPPED | OUTPATIENT
Start: 2019-12-17 | End: 2020-02-13

## 2019-12-17 RX ORDER — IBUPROFEN 200 MG
400-600 TABLET ORAL EVERY 8 HOURS PRN
Qty: 100 TABLET | Refills: 0 | Status: SHIPPED | OUTPATIENT
Start: 2019-12-17 | End: 2024-03-11

## 2019-12-17 ASSESSMENT — PAIN SCALES - GENERAL: PAINLEVEL: SEVERE PAIN (6)

## 2019-12-17 NOTE — PROGRESS NOTES
"  [facility]         HPI:       Tay Hodges is a 69 year old male who presents to clinic for: Back Pain (Pt reports 6/10 back pain for the last nine days. He reports that it is from coughing )    Patient reports that he developed a bad cough two weeks ago, worsened into what he thought was a very bad bronchitis.  About 10 days ago he had a particularly vigorous coughing spell and thinks that he \"threw his back out\" while coughing.  He couldn't move at all without intense pain.  Described pain as intense soreness that radiates down his left leg at times.  Has been taking Motrin 400 mg Q4hr, improves pain to 6/10.  Says that if he lies down flat in bed for 3 hours, he cannot move afterwards.  He has had similar back injuries in past, usually gets better faster than this.  Last episode 10 years ago.  At home he has been wearing girdle, uses hot pads, both help with the pain.  Says it hurts to sit, so he will stand most of the day.  He notes that his cough better now, but when his back was not healing as fast as it had in the past he decided to come into clinic.  He denies loss of bowel or bladder continence, denies numbness in his groin or lower extremities.  Denies and recent trauma or injury.      PMHx:  Past Medical History:   Diagnosis Date     Benign liver cyst     hemangioma 3/18, no f/u indicated     Colon polyps     colo due again 3 years (2021)     Coronary atherosclerosis of native coronary artery 12/19/2012    Mild disease seen on angiogram     Hypertension      Nephrolithiasis 12/13/2011     Other and unspecified hyperlipidemia 12/13/2011     Pancreas cyst      Unspecified hypothyroidism 12/13/2011       PSHx:  Past Surgical History:   Procedure Laterality Date     LITHOTRIPSY       partial thyroidectomy         FamHx:  Family History   Problem Relation Age of Onset     Cancer Other      Cancer Other      Cancer Father         Lung cancer     Alzheimer Disease Mother      C.A.D. Maternal " Grandfather        Allergies:   No Known Allergies    Meds:    Current Outpatient Medications:      amLODIPine (NORVASC) 2.5 MG tablet, Take 1 tablet (2.5 mg) by mouth daily, Disp: 90 tablet, Rfl: 3     aspirin 325 MG tablet, Take 325 mg by mouth daily., Disp: , Rfl:      atorvastatin (LIPITOR) 80 MG tablet, Take 1 tablet (80 mg) by mouth daily, Disp: 90 tablet, Rfl: 3     ibuprofen (ADVIL) 200 MG tablet, Take 200 mg by mouth every 4 hours as needed., Disp: , Rfl:      ibuprofen (ADVIL/MOTRIN) 200 MG tablet, Take 2-3 tablets (400-600 mg) by mouth every 8 hours as needed for pain, Disp: 100 tablet, Rfl: 0     levothyroxine (SYNTHROID/LEVOTHROID) 112 MCG tablet, Take 1 tablet (112 mcg) by mouth daily, Disp: 90 tablet, Rfl: 3     order for DME, Equipment being ordered: DME Blood Pressure Monitor, [employer] or other covered per insurance, Disp: 1 Units, Rfl: 0     tiZANidine (ZANAFLEX) 2 MG tablet, Take 1-2 tablets (2-4 mg) by mouth 3 times daily as needed for muscle spasms, Disp: 21 tablet, Rfl: 0    SocHx:  Social History     Socioeconomic History     Marital status:      Spouse name: None     Number of children: None     Years of education: None     Highest education level: None   Occupational History     None   Social Needs     Financial resource strain: None     Food insecurity:     Worry: None     Inability: None     Transportation needs:     Medical: None     Non-medical: None   Tobacco Use     Smoking status: Never Smoker     Smokeless tobacco: Never Used   Substance and Sexual Activity     Alcohol use: Yes     Comment: 10     Drug use: None     Sexual activity: None   Lifestyle     Physical activity:     Days per week: None     Minutes per session: None     Stress: None   Relationships     Social connections:     Talks on phone: None     Gets together: None     Attends Caodaism service: None     Active member of club or organization: None     Attends meetings of clubs or organizations: None     Relationship  status: None     Intimate partner violence:     Fear of current or ex partner: None     Emotionally abused: None     Physically abused: None     Forced sexual activity: None   Other Topics Concern     None   Social History Narrative    Retired last summer 2017 as [employer]ellor of [employer], 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  Patient is an established patient of this clinic.         Review of Systems:   ROS  I have personally reviewed and updated the complete ROS on the day of the visit.           Physical Exam:   BP (!) 146/92 (BP Location: Right arm, Patient Position: Sitting, Cuff Size: Adult Regular)   Pulse 74   Wt 84.1 kg (185 lb 6.4 oz)   SpO2 99%   BMI 27.38 kg/m    Body mass index is 27.38 kg/m .  Vitals were reviewed       GENERAL APPEARANCE: healthy, alert and no distress     EYES: EOMI     HENT: NCAT, mouth without ulcers or lesions     RESP: lungs clear to auscultation - no rales, rhonchi or wheezes     CV: regular rates and rhythm, normal S1 S2, no S3 or S4 and no murmur, click or rub     ABDOMEN:  soft, nontender, no HSM or masses and bowel sounds normal     MS: extremities normal- no gross deformities noted, no evidence of inflammation in joints, FROM in all extremities.  No midline tenderness over his thoracic and lumbar spine.  Tenderness to palpation over left lower paraspinal muscles at about the level of L4/L5.  Straight leg raise elicits intense pain at this area, but does not result in radiation of pain down left leg.       SKIN: no  suspicious lesions or rashes     NEURO: Normal strength and tone, sensory exam grossly normal, mentation intact and speech normal.  Normal sensation over pelvic girdle and at LE bilaterally.      PSYCH: mentation appears normal. and affect normal/bright        Results:     Orders Only on 02/11/2019   Component Date Value Ref Range Status     TSH 02/11/2019 0.89  0.40 - 4.00 mU/L Final     Sodium 02/11/2019 138  133 - 144 mmol/L Final     Potassium 02/11/2019 4.0  3.4 - 5.3 mmol/L Final     Chloride 02/11/2019 104  94 - 109 mmol/L Final     Carbon Dioxide 02/11/2019 32  20 - 32 mmol/L Final     Anion Gap 02/11/2019 3  3 - 14 mmol/L Final     Glucose 02/11/2019 101* 70 - 99 mg/dL Final     Urea Nitrogen 02/11/2019 20  7 - 30 mg/dL Final     Creatinine 02/11/2019 0.86  0.66 - 1.25 mg/dL Final     GFR Estimate 02/11/2019 90  >60 mL/min/[1.73_m2] Final    Comment: Non  GFR Calc  Starting 12/18/2018, serum creatinine based estimated GFR (eGFR) will be   calculated using the Chronic Kidney Disease Epidemiology Collaboration   (CKD-EPI) equation.       GFR Estimate If Black 02/11/2019 >90  >60 mL/min/[1.73_m2] Final    Comment:  GFR Calc  Starting 12/18/2018, serum creatinine based estimated GFR (eGFR) will be   calculated using the Chronic Kidney Disease Epidemiology Collaboration   (CKD-EPI) equation.       Calcium 02/11/2019 8.8  8.5 - 10.1 mg/dL Final     Bilirubin Total 02/11/2019 0.8  0.2 - 1.3 mg/dL Final     Albumin 02/11/2019 3.9  3.4 - 5.0 g/dL Final     Protein Total 02/11/2019 7.3  6.8 - 8.8 g/dL Final     Alkaline Phosphatase 02/11/2019 48  40 - 150 U/L Final     ALT 02/11/2019 33  0 - 70 U/L Final     AST 02/11/2019 22  0 - 45 U/L Final     Cholesterol 02/11/2019 138  <200 mg/dL Final     Triglycerides 02/11/2019 57  <150 mg/dL Final     HDL Cholesterol 02/11/2019 57  >39 mg/dL Final     LDL Cholesterol Calculated 02/11/2019 70  <100 mg/dL Final    Desirable:       <100 mg/dl      Non HDL Cholesterol 02/11/2019 81  <130 mg/dL Final     PSA 02/11/2019 2.66  0 - 4 ug/L Final    Assay Method:  Chemiluminescence using Siemens Vista analyzer       Lab Results   Component Value Date    WBC 5.8 01/08/2014    HGB 14.1 01/08/2014    HCT 42.0 01/08/2014     01/08/2014     02/11/2019    POTASSIUM 4.0 02/11/2019    CHLORIDE 104 02/11/2019    CO2 32 02/11/2019    BUN 20 02/11/2019    CR 0.86 02/11/2019     (H) 02/11/2019    AST 22 02/11/2019    ALT 33 02/11/2019    ALKPHOS 48 02/11/2019    BILITOTAL 0.8 02/11/2019       Assessment and Plan     Tay was seen today for back pain.    Diagnoses and all orders for this visit:    Back strain, initial encounter  No exam evidence of radiculopathy.  Seemingly a severe muscle strain (either erector spinae muscles or internal oblique?) similar to past injuries.  Patient has already been taking max dose of ibuprofen, but not at antiinflammatory doses.  Advised him to take higher doses at 600 mg or 800 mg (if lower ineffective) at longer intervals to see if higher dose provides more antiinflammatory benefit.  Patient prefers not to take muscle relaxer, but this will be prescribed in case Motrin provides no benefit at higher dose.  Advised patient that PT woul dbe helpful, but he is traveling for two weeks to the holiday, so he will utilize a referral if he is still in pain when he returns.    -     ibuprofen (ADVIL/MOTRIN) 200 MG tablet; Take 2-3 tablets (400-600 mg) by mouth every 8 hours as needed for pain  -     tiZANidine (ZANAFLEX) 2 MG tablet; Take 1-2 tablets (2-4 mg) by mouth 3 times daily as needed for muscle spasms  -     PHYSICAL THERAPY REFERRAL; Future    Options for treatment and follow-up care were reviewed with the patient. Tay Hodges engaged in the decision making process and verbalized understanding of the options discussed and agreed with the final plan.    Jeb Johnson, DO  Dec 17, 2019    Pt was seen  and plan of care discussed with Dr. Villanueva.     Attending Addendum:  Patient seen and examined with resident in clinic today.  Pertinent portions of history and exam were independently verified by myself.  I agree with the exam and plan as outlined above with the following modifications: none.  Louann Villanueva MD  Internal Medicine

## 2019-12-17 NOTE — PATIENT INSTRUCTIONS
[facility] Medication Refill Request Information:  * Please contact your pharmacy regarding ANY request for medication refills.  ** [facility] Prescription Fax = [phone]  * Please allow 3 business days for routine medication refills.  * Please allow 5 business days for controlled substance medication refills.     [facility] Test Result notification information:  *You will be notified with in 7-10 days of your appointment day regarding the results of your test.  If you are on MyChart you will be notified as soon as the provider has reviewed the results and signed off on them.    [facility]: [phone]          [facility]         Internal Medicine Resident                   Continuity Clinic    Who We Are    Resident Continuity Clinic is a part of the [facility] Primary Care Clinic.  Resident physicians see patients independently and establish a relationship with them over the course of their three-year residency program.  As with the Primary Care Clinic, our Resident Continuity Clinic models a group practice.  If your doctor is not available, you will be able to see another resident physician.  At the end of a resident s training, patients will be transitioned to a new resident physician for ongoing care.     We treat patients with a wide array of medical needs from routine physicals, to acute illnesses, to diabetes and blood pressure management, to complex medical illness.  What is a Resident Physician?    Resident physicians hold medical degrees and are doctors. They are training to become specialists in Internal Medicine. They work under the supervision of board-certified faculty physicians.  Expectations for Your Care    We strive to provide accessible, quality care at all times.    In order to provide this care, it is best to see your primary care resident doctor consistently rather switching between providers.  In the event you do see another physician, you should schedule  a follow-up visit with your usual primary care doctor.    If you are transitioning your care from another clinic, it is helpful to have your records available for your doctor to review.    We do not prescribe controlled substances, such as ADD medications or narcotic pain medications, on your first visit.  We will review your health records and concerns prior to devising a treatment plan with you in order to provide the best care.      Clinic Services     Extended clinic hours; patient  to help navigate your visit;  parking; laboratory and imaging services with evening and weekend hours    Multiple medical and surgical specialties in one building    Complementary services, including Nutrition, Integrative Medicine, Pharmacy consultations, Mental and Behavioral Health, Sports Medicine and Physical Therapy    Thank You    We would like to thank you for choosing the [facility] Internal Medicine Resident Continuity Clinic for your primary care. You are making a priceless contribution to the training of the next generation of health care practitioners.     Contact us at [phone] for appointments or questions.    Resident Clinic Hours are Tuesdays and Thursdays, 7:30am-5:00pm    Residents   Rasheed Gupta MD  (Male)   Meng Sinclair MD   (Male)   Dodie Dillard MD  (Female)  Stephanie Linares MD   (Female)   Roz Casanova MD   (Female)    Latrell Grayson MD    (Female)   Lencho Johnson MD  (Male)   Jim Cuadra MD  (Male)    Solange Aldana MD  (Female)   Hua Rangel MD  (Female)   Fidelia Razo MD    (Female)   David Mejia MD  (Male)   Rodo Walden MD  (Male)   Ty Cagle MD  (Male)   RAY Nance MD   (Male)   Darshan Dixon MD  (Male)    Nafisa Johnston MD (Female)   Obdulio Ibrahim MD  (Male)   Sage Barron MD  (Male)   Addison Morrison MD  (Male)   Brooke Mcfadden MD    (Female)   Britt Edouard MD  (Female)     Supervising Physicians   MD Frankie Montiel,  "MD Marilee Sidhu, MD Hammad Lucas, MD Magnus Townsend, MD Louann Villanueva, MD Eric Kirkland MD Heather Thompson Buum, MD          Patient Education       * Sciatica    Sciatica (\"Lumbar Radiculopathy\") causes a pain that spreads from the lower back down into the buttock, hip and leg. Sometimes leg pain can occur without any back pain. Sciatica is due to irritation or pressure on a spinal nerve as it comes out of the spinal canal. This is most often due to pressure on the nerve from a nearby spinal disk (the cartilage cushion between each spinal bone). Other causes include spinal stenosis (narrowing of the spinal canal) and spasm of the piriformis muscle (a muscle in the buttocks that the sciatic nerve passes through).  Sciatica may begin after a sudden twisting/bending force (such as in a car accident), or sometimes after a simple awkward movement. In either case, muscle spasm is commonly present and can add to the pain.  The diagnosis of sciatica is made from the symptoms and physical exam. Unless you had a physical injury (such as a car accident or fall), X-rays are usually not ordered for the initial evaluation of sciatica because the nerves and disks cannot be seen on an X-ray. Most sciatica (80-90%) gets better with time.  What can I do about my low back pain?  There are three main things you can do to ease low back pain and help it go away.    Stay active! Use positions, movements and exercises. Too much rest can make your symptoms worse.    Use heat or cold packs.    Take medicine as directed.  Using positions, movements and exercises  Research tells us that moving your joints and muscles can help you recover from back pain. Such activity should be simple and gentle.  Use walking to help relieve your discomfort. Try taking a short walk every 3 to 4 hours during the day. Walk for a few minutes inside your home or take longer walks outside, on a treadmill or at a mall. Slowly " increase the amount of time you walk. Expect discomfort when you begin, but it should lessen as your back starts to recover.  Finding a position that is comfortable  When your back pain is new, you may find that certain positions will ease your pain. Gently try each of the following positions until you find one that eases your pain. Once you find a position of comfort, use it as often as you like while you recover. Return to your daily routine as soon as possible.     Lie on your back with your legs bent. You can do this by placing a pillow under your knees or lie on the floor and rest your lower legs on the seat of a chair.    Lie on your side with your knees bent and place a pillow between your knees.    Lie on your stomach over pillows.  Using heat or cold packs  Try cold packs or gentle heat to ease your pain. Use whichever gives you the most relief. Apply the cold pack or heat for 15 minutes at a time, as often as needed.  Taking medicine  If taking over-the-counter medicine:    Take ibuprofen (Advil, Motrin) 600 mg. three times a day as needed for pain.  OR    Take Aleve (naproxen sodium) 220 to 440 mg. two times a day as needed for pain.  If your doctor prescribed medicine, follow the instructions. Stop taking the medicine as soon as you can.  When should I call my doctor?  Your back pain should improve over the first couple of weeks. As it improves, you should be able to return to your normal activities. But call your doctor if:    You have a sudden change in your ability to control? your bladder or bowels.    You begin to feel tingling in your groin or legs.    The pain spreads down your leg and into your foot.    Your toes, feet or leg muscles begin to feel weak.    You feel generally unwell or sick.    Your pain gets worse.    4205-1906 [employer]). [address] [place] [address], [place] [address]. All rights reserved. This information is not intended as a substitute for professional medical care.  Always follow your healthcare professional's instructions.  This information has been modified by your health care provider with permission from the publisher.

## 2019-12-17 NOTE — NURSING NOTE
Chief Complaint   Patient presents with     Back Pain     Pt reports 6/10 back pain for the last nine days. He reports that it is from coughing      REBECCA Bright at 12:53 PM sign on 12/17/2019

## 2020-01-18 DIAGNOSIS — I10 BENIGN ESSENTIAL HYPERTENSION: ICD-10-CM

## 2020-01-20 RX ORDER — AMLODIPINE BESYLATE 2.5 MG/1
2.5 TABLET ORAL DAILY
Qty: 90 TABLET | Refills: 0 | Status: SHIPPED | OUTPATIENT
Start: 2020-01-20 | End: 2020-02-14

## 2020-01-20 NOTE — TELEPHONE ENCOUNTER
AMLODIPINE BESYLATE 2.5MG TABLETS      Last Written Prescription Date:  2/11/2019  Last Fill Quantity: 90,   # refills: 3  Last Office Visit : 12/17/2019  Future Office visit:  2/13/2020    Routing refill request to provider for review/approval because:  Blood pressure out of range    Per Protocol can refill med for 90 days and refer to Provider when B/P > 140/90  Change Med?    Change Dose?     Refer to Provider for review  12/17/19 (!) 146/92       Nuria Franco RN  Central Triage Red Flags/Med Refills

## 2020-02-10 ASSESSMENT — ACTIVITIES OF DAILY LIVING (ADL)
IN_THE_PAST_7_DAYS,_DID_YOU_NEED_HELP_FROM_OTHERS_TO_TAKE_CARE_OF_THINGS_SUCH_AS_LAUNDRY_AND_HOUSEKEEPING,_BANKING,_SHOPPING,_USING_THE_TELEPHONE,_FOOD_PREPARATION,_TRANSPORTATION,_OR_TAKING_YOUR_OWN_MEDICATIONS?: N
IN_THE_PAST_7_DAYS,_DID_YOU_NEED_HELP_FROM_OTHERS_TO_PERFORM_EVERYDAY_ACTIVITIES_SUCH_AS_EATING,_GETTING_DRESSED,_GROOMING,_BATHING,_WALKING,_OR_USING_THE_TOILET: N

## 2020-02-13 ENCOUNTER — OFFICE VISIT (OUTPATIENT)
Dept: INTERNAL MEDICINE | Facility: CLINIC | Age: 70
End: 2020-02-13
Payer: MEDICARE

## 2020-02-13 VITALS
OXYGEN SATURATION: 100 % | WEIGHT: 184.5 LBS | HEART RATE: 71 BPM | HEIGHT: 69 IN | SYSTOLIC BLOOD PRESSURE: 138 MMHG | BODY MASS INDEX: 27.33 KG/M2 | TEMPERATURE: 98.5 F | DIASTOLIC BLOOD PRESSURE: 85 MMHG

## 2020-02-13 DIAGNOSIS — E03.4 HYPOTHYROIDISM DUE TO ACQUIRED ATROPHY OF THYROID: ICD-10-CM

## 2020-02-13 DIAGNOSIS — I10 BENIGN ESSENTIAL HYPERTENSION: ICD-10-CM

## 2020-02-13 DIAGNOSIS — E78.5 HYPERLIPIDEMIA, UNSPECIFIED HYPERLIPIDEMIA TYPE: ICD-10-CM

## 2020-02-13 DIAGNOSIS — R73.01 ELEVATED FASTING GLUCOSE: ICD-10-CM

## 2020-02-13 DIAGNOSIS — Z12.5 SCREENING FOR PROSTATE CANCER: ICD-10-CM

## 2020-02-13 DIAGNOSIS — Z23 NEED FOR SHINGLES VACCINE: ICD-10-CM

## 2020-02-13 DIAGNOSIS — R35.1 NOCTURIA: Primary | ICD-10-CM

## 2020-02-13 LAB
ALBUMIN SERPL-MCNC: 4 G/DL (ref 3.4–5)
ALP SERPL-CCNC: 48 U/L (ref 40–150)
ALT SERPL W P-5'-P-CCNC: 35 U/L (ref 0–70)
ANION GAP SERPL CALCULATED.3IONS-SCNC: 1 MMOL/L (ref 3–14)
AST SERPL W P-5'-P-CCNC: 20 U/L (ref 0–45)
BILIRUB SERPL-MCNC: 0.7 MG/DL (ref 0.2–1.3)
BUN SERPL-MCNC: 17 MG/DL (ref 7–30)
CALCIUM SERPL-MCNC: 9.7 MG/DL (ref 8.5–10.1)
CHLORIDE SERPL-SCNC: 107 MMOL/L (ref 94–109)
CHOLEST SERPL-MCNC: 136 MG/DL
CO2 SERPL-SCNC: 32 MMOL/L (ref 20–32)
CREAT SERPL-MCNC: 0.8 MG/DL (ref 0.66–1.25)
GFR SERPL CREATININE-BSD FRML MDRD: >90 ML/MIN/{1.73_M2}
GLUCOSE SERPL-MCNC: 111 MG/DL (ref 70–99)
HDLC SERPL-MCNC: 67 MG/DL
LDLC SERPL CALC-MCNC: 58 MG/DL
NONHDLC SERPL-MCNC: 70 MG/DL
POTASSIUM SERPL-SCNC: 4.5 MMOL/L (ref 3.4–5.3)
PROT SERPL-MCNC: 7.5 G/DL (ref 6.8–8.8)
PSA SERPL-ACNC: 3.72 UG/L (ref 0–4)
SODIUM SERPL-SCNC: 140 MMOL/L (ref 133–144)
TRIGL SERPL-MCNC: 60 MG/DL
TSH SERPL DL<=0.005 MIU/L-ACNC: 1.22 MU/L (ref 0.4–4)

## 2020-02-13 RX ORDER — LEVOTHYROXINE SODIUM 112 UG/1
112 TABLET ORAL DAILY
Qty: 90 TABLET | Refills: 3 | Status: CANCELLED | OUTPATIENT
Start: 2020-02-13

## 2020-02-13 RX ORDER — TAMSULOSIN HYDROCHLORIDE 0.4 MG/1
0.4 CAPSULE ORAL DAILY
Qty: 90 CAPSULE | Refills: 3 | Status: SHIPPED | OUTPATIENT
Start: 2020-02-13 | End: 2021-01-12

## 2020-02-13 RX ORDER — ATORVASTATIN CALCIUM 80 MG/1
80 TABLET, FILM COATED ORAL DAILY
Qty: 90 TABLET | Refills: 3 | Status: CANCELLED | OUTPATIENT
Start: 2020-02-13

## 2020-02-13 RX ORDER — AMLODIPINE BESYLATE 5 MG/1
5 TABLET ORAL DAILY
Qty: 90 TABLET | Refills: 3 | Status: CANCELLED | OUTPATIENT
Start: 2020-02-13

## 2020-02-13 ASSESSMENT — PAIN SCALES - GENERAL: PAINLEVEL: NO PAIN (0)

## 2020-02-13 ASSESSMENT — MIFFLIN-ST. JEOR: SCORE: 1592.52

## 2020-02-13 NOTE — PROGRESS NOTES
[facility]  Primary Care Center   Louann Villanueva MD  02/13/2020      Chief Complaint:   Physical       History of Present Illness:   Tay Hodges is a 70 year old male with a history of colon polyps, hypertension, coronary atherosclerosis, hyperlipidemia, and hypothyroidism who presents for a routine physical exam.    Back strain: His back took about 3 weeks to fully recover, but he was ambulatory after 2 weeks. This was brought on by a bad cough. This has happened 3-4 times in the past 20 years. His daughter sent him a set of back exercises, which he has been doing for the past 2 weeks and finds helpful. He finds that walking is also very helpful, but the weather makes it difficult. He tries to get in his walks as much as possible, but when it is too cold or icy he does not.     Health maintenance: He checks his blood pressure at home, and usually finds his systolic between 125-140. He is due for routine labs. He is due for shingles vaccine. He is due for a colonoscopy in 2021. He is not due for pancreatic cyst repeat MRI until 2022, past MRIs have been stable. His weight fluctuates between 176-180. He has shrunk about 1.5 inches in the last 4-5 years. He is enjoying [facility]. He walks for exercise but does not go to the gym and never plans on it. He has already gotten a flu shot.     Nocturia: He usually has to wake up to urinate during the night every 2-5 hours to urinate. He is interested in trying medication.      Routine Health Maintenance:  Immunizations (zoster, pneumovax, flu, Tdap, Hep A/B):        Most Recent Immunizations   Administered Date(s) Administered     Flu 65+ Years 11/08/2018     Influenza (High Dose) 3 valent vaccine 02/01/2017     Influenza (IIV3) PF 01/20/2016     Pneumo Conj 13-V (2010&after) 02/01/2017     Pneumococcal 23 valent 02/09/2018     TD (ADULT, 7+) 07/01/2004     TDAP Vaccine (Boostrix) 12/19/2012     Zoster vaccine, live 12/19/2012         Lipids:   Recent Labs    Lab Test 02/11/19  1005 02/09/18  0950  01/14/15  1056 12/19/12  0957   CHOL 138 138   < > 149 151   HDL 57 58   < > 67 55   LDL 70 73   < > 74 88   TRIG 57 41   < > 40 38   CHOLHDLRATIO  --   --   --  2.2 2.7    < > = values in this interval not displayed.          PSA (50-75 yrs):   PSA   Date Value Ref Range Status   02/11/2019 2.66 0 - 4 ug/L Final     Comment:     Assay Method:  Chemiluminescence using Siemens Vista analyzer      AAA Screening (65-75 yrs): n/a  Lung Ca Screening (>30 pk age 55-79 or >20 py age 50-79 + RF): n/a  Colonoscopy (50-75 yrs): 3/18 several large TAs, rec 3 years follow up  Dexa (>65W or 70M yrs): n/a  HIV/HCV if risk factors: HCV neg 2017  Safety/Lifestyle: reviewed  Tob/EtOH: reviewed  Depression: screen neg  Advanced Directive:  deferred  Panc MRI: due 2022    Medicare Annual Wellness Visit    This 70 year old year old male presents for an initial Medicare Wellness Exam.           Medical Care     Have you been to an ER or a hospital in the last year? No  What other specialists or organizations are involved in your medical care?  none  Current providers sharing in care for this patient include:  Patient Care Team       Relationship Specialty Notifications Start End    Bruceeakiran, Louann Lombardo MD PCP - General Internal Medicine  8/22/17     Phone: [phone] Fax: [phone]         [address] [address]                 Social History     Marital Status:  Who lives in your household? wife  Does your home have any of the following safety concerns? Loose rugs in the hallway, no grab bars in the bathroom, no handrails on the stairs or have poorly lit areas?  No  Do you feel threatened or controlled by a partner, ex-partner or anyone in your life? No  Has anyone hurt you physically, for example by pushing, hitting, slapping or kicking you   or forcing you to have sex? No  Do you need help with the phone, transportation, shopping, preparing meals, housework,  laundry, medications or managing money? No   Have you noticed any hearing difficulties? No      Risk Behaviors and Healthy Habits     How many servings of fruits and vegetables do you eat a day? 3-4  How often do you exercise and what do you do? 20 minutes a day  Do you frequently ride without a seatbelt? No  Do you use tobacco?  No  Do you use any other drugs? No       Do you use alcohol?Yes  Number of drinking days a week 1      Sexual Health     Are you sexually active?  No   Have you had any sexually transmitted infections? No   Any sexual concerns? No     FUNCTIONAL ABILITY/SAFETY SCREENING   **RS to complete Fall Risk, PHQ2 in Assessments prior**    Fall Risk Assessment Today:   Fall Risk Screening:  FALL RISK ASSESSMENT 2/13/2020   Fallen 2 or more times in the past year? No   Any fall with injury in the past year? No         Hearing evaluation if done: No    Visual acuity : not assessed today       EVALUATION OF COGNITIVE FUNCTION       Mini Cog Scoring   Not assessed today  Clock Draw Test result:  Not assessed today        PHQ-2 Score:  0    PHQ-2 ( 1999 Pfizer) 2/13/2020 10/1/2019   Q1: Little interest or pleasure in doing things 0 0   Q2: Feeling down, depressed or hopeless 0 0   PHQ-2 Score 0 0   Q1: Little interest or pleasure in doing things - Not at all   Q2: Feeling down, depressed or hopeless - Not at all   PHQ-2 Score - 0       SCREENING FOR PREVENTION and EARLY DETECTION     Advanced Directives: Not discussed today    **RS to STOP HERE**    Reviewed Immunization Record Today    Most Recent Immunizations   Administered Date(s) Administered     Flu 65+ Years 11/08/2018     Influenza (High Dose) 3 valent vaccine 10/21/2019     Influenza (IIV3) PF 01/20/2016     Pneumo Conj 13-V (2010&after) 02/01/2017     Pneumococcal 23 valent 02/09/2018     TD (ADULT, 7+) 07/01/2004     TDAP Vaccine (Boostrix) 12/19/2012     Zoster vaccine, live 12/19/2012   Pended Date(s) Pended     Zoster vaccine recombinant  adjuvanted (SHINGRIX) 02/13/2020       Reviewed Health Maintenance Completed and Due    Health Maintenance   Topic Date Due     ADVANCE CARE PLANNING  1950     ZOSTER IMMUNIZATION (2 of 3) 02/13/2013     PHQ-2  01/01/2020     FALL RISK ASSESSMENT  02/11/2020     MEDICARE ANNUAL WELLNESS VISIT  02/11/2020     DTAP/TDAP/TD IMMUNIZATION (3 - Td) 12/19/2022     LIPID  02/11/2024     COLONOSCOPY  03/19/2028     HEPATITIS C SCREENING  Completed     INFLUENZA VACCINE  Completed     PNEUMOCOCCAL IMMUNIZATION 65+ LOW/MEDIUM RISK  Completed     AORTIC ANEURYSM SCREENING (SYSTEM ASSIGNED)  Completed     IPV IMMUNIZATION  Aged Out     MENINGITIS IMMUNIZATION  Aged Out       Health Maintenance Due   Topic Date Due     ADVANCE CARE PLANNING  1950     ZOSTER IMMUNIZATION (2 of 3) 02/13/2013     PHQ-2  01/01/2020     FALL RISK ASSESSMENT  02/11/2020     MEDICARE ANNUAL WELLNESS VISIT  02/11/2020       CV Risk based on Pooled Cohort Risk:  The 10-year ASCVD risk score (Jakob OLIVEROS Jr., et al., 2013) is: 15.8%    Values used to calculate the score:      Age: 70 years      Sex: Male      Is Non- : No      Diabetic: No      Tobacco smoker: No      Systolic Blood Pressure: 138 mmHg      Is BP treated: Yes      HDL Cholesterol: 57 mg/dL      Total Cholesterol: 138 mg/dL    Review of Systems:   A full 10-pt Review of Systems was performed, verified and is negative except as documented in the HPI.  All health questionnaires were reviewed, verified and relevant information documented above.    Active Medications:     Current Outpatient Medications:      amLODIPine (NORVASC) 2.5 MG tablet, Take 1 tablet (2.5 mg) by mouth daily, Disp: 90 tablet, Rfl: 0     aspirin 325 MG tablet, Take 325 mg by mouth daily., Disp: , Rfl:      atorvastatin (LIPITOR) 80 MG tablet, Take 1 tablet (80 mg) by mouth daily, Disp: 90 tablet, Rfl: 3     ibuprofen (ADVIL/MOTRIN) 200 MG tablet, Take 2-3 tablets (400-600 mg) by mouth every 8  "hours as needed for pain, Disp: 100 tablet, Rfl: 0     levothyroxine (SYNTHROID/LEVOTHROID) 112 MCG tablet, Take 1 tablet (112 mcg) by mouth daily, Disp: 90 tablet, Rfl: 3     tamsulosin (FLOMAX) 0.4 MG capsule, Take 1 capsule (0.4 mg) by mouth daily, Disp: 90 capsule, Rfl: 3      Allergies:   Patient has no known allergies.     Past Medical History:  Benign liver cyst   Colon polyps  Coronary atherosclerosis of native coronary artery  Hypertension  Nephrolithiasis  Hyperlipidemia  Pancreas cyst  Hypothyroidism  Lip lesion      Past Surgical History:  Lithotripsy  Partial thyroidectomy    Family History:  Lung cancer: father  Early onset alzheimer disease: mother (60s)  Coronary artery disease: maternal grandfather  Thyroid disease: siblings  Melanoma: siblings        Social History:   Smoking status: never smoker  Alcohol use: yes       Physical Exam:   /85 (BP Location: Right arm, Patient Position: Sitting, Cuff Size: Adult Regular)   Pulse 71   Temp 98.5  F (36.9  C) (Oral)   Ht 1.745 m (5' 8.7\")   Wt 83.7 kg (184 lb 8 oz)   SpO2 100%   BMI 27.48 kg/m     Constitutional: Alert, oriented, pleasant, no acute distress  Head: Normocephalic, atraumatic  Eyes: Extra-ocular movements intact, no scleral icterus  ENT: Oropharynx clear, moist mucus membranes, good dentition  Neck: Supple, no lymphadenopathy  Cardiovascular: Regular rate and rhythm, no murmurs, rubs or gallops, peripheral pulses full/symmetric  Respiratory: Good air movement bilaterally, lungs clear, no wheezes/rales/rhonchi  GI: Abdomen soft, bowel sounds present, nondistended, nontender, no organomegaly or masses, no rebound/guarding  Musculoskeletal: No edema, normal muscle tone, normal gait  Neurologic: Alert and oriented, cranial nerves 2-12 intact.  Skin: No rashes/lesions  Psychiatric: normal mentation, affect and mood      Assessment and Plan:  Hyperlipidemia, unspecified hyperlipidemia type  - Lipid panel reflex to direct LDL " Fasting    Benign essential hypertension  Suboptimally controlled, we discussed increasing his dose of Amlodipine.   - Comprehensive metabolic panel    Hypothyroidism due to acquired atrophy of thyroid  - TSH with free T4 reflex    Nocturia  Discussed risks and benefits of medications. He is willing to try. He will monitor his blood pressure at home.   - tamsulosin (FLOMAX) 0.4 MG capsule  Dispense: 90 capsule; Refill: 3    Need for shingles vaccine  - HC ZOSTER VACCINE RECOMBINANT ADJUVANTED IM NJX    Screening for prostate cancer  - PSA screen    Lumbar Strain  Resolved after 3 weeks of stretching, heat, and muscle relaxers.    IPMN of the pancreas  He has had 2 stable MRIs and is not due for another until 2022.      Follow-up: No follow-ups on file.         Scribe Disclosure:  Belen GONZALES, am serving as a scribe to document services personally performed by Louann Villanueva MD at this visit, based upon the provider's statements to me. All documentation has been reviewed by the aforementioned provider prior to being entered into the official medical record.    Scribe Preparation Attestation:  Belen GONZALES, a scribe, prepared the chart for today's encounter.      Portions of this medical record were completed by a scribe. UPON MY REVIEW AND AUTHENTICATION BY ELECTRONIC SIGNATURE, this confirms (a) I performed the applicable clinical services, and (b) the record is accurate.   Louann Villanueva MD  Internal Medicine

## 2020-02-13 NOTE — NURSING NOTE
Chief Complaint   Patient presents with     Physical     pt here for physical       Richelle Talley CMA at 8:22 AM on 2/13/2020.

## 2020-02-13 NOTE — PATIENT INSTRUCTIONS
[facility] Medication Refill Request Information:  * Please contact your pharmacy regarding ANY request for medication refills.  ** [facility] Prescription Fax = [phone]  * Please allow 3 business days for routine medication refills.  * Please allow 5 business days for controlled substance medication refills.     [facility] Test Result notification information:  *You will be notified with in 7-10 days of your appointment day regarding the results of your test.  If you are on MyChart you will be notified as soon as the provider has reviewed the results and signed off on them.    [facility]: [phone]

## 2020-02-14 RX ORDER — AMLODIPINE BESYLATE 5 MG/1
5 TABLET ORAL DAILY
Qty: 90 TABLET | Refills: 3 | Status: SHIPPED | OUTPATIENT
Start: 2020-02-14 | End: 2021-01-15

## 2020-02-14 RX ORDER — ATORVASTATIN CALCIUM 80 MG/1
80 TABLET, FILM COATED ORAL DAILY
Qty: 90 TABLET | Refills: 3 | Status: SHIPPED | OUTPATIENT
Start: 2020-02-14 | End: 2021-02-15

## 2020-02-14 RX ORDER — LEVOTHYROXINE SODIUM 112 UG/1
112 TABLET ORAL DAILY
Qty: 90 TABLET | Refills: 3 | Status: SHIPPED | OUTPATIENT
Start: 2020-02-14 | End: 2021-02-15

## 2020-04-26 DIAGNOSIS — I10 BENIGN ESSENTIAL HYPERTENSION: ICD-10-CM

## 2020-04-27 RX ORDER — AMLODIPINE BESYLATE 2.5 MG/1
TABLET ORAL
Qty: 90 TABLET | Refills: 0 | OUTPATIENT
Start: 2020-04-27

## 2020-08-03 ENCOUNTER — MYC MEDICAL ADVICE (OUTPATIENT)
Dept: INTERNAL MEDICINE | Facility: CLINIC | Age: 70
End: 2020-08-03

## 2020-08-05 ENCOUNTER — ALLIED HEALTH/NURSE VISIT (OUTPATIENT)
Dept: INTERNAL MEDICINE | Facility: CLINIC | Age: 70
End: 2020-08-05
Payer: MEDICARE

## 2020-08-05 DIAGNOSIS — Z23 NEED FOR VACCINATION: Primary | ICD-10-CM

## 2020-08-05 NOTE — NURSING NOTE
Chief Complaint   Patient presents with     Imm/Inj     Pt here for 2nd SHINGRIX     Tay Hodges comes into clinic today at the request of Dr. Villanueva. Ordering Provider for 2nd SHINGRIX vaccination.    Pt received injection and tolerated well.    This service provided today was under the supervising provider of the day Dr. Carmnoa, who was available if needed.    Kimberly H. Nissen Kimberly Nissen, EMT at 10:59 AM on 8/5/2020

## 2020-10-14 ENCOUNTER — ALLIED HEALTH/NURSE VISIT (OUTPATIENT)
Dept: INTERNAL MEDICINE | Facility: CLINIC | Age: 70
End: 2020-10-14
Payer: MEDICARE

## 2020-10-14 DIAGNOSIS — Z23 NEED FOR PROPHYLACTIC VACCINATION AND INOCULATION AGAINST INFLUENZA: Primary | ICD-10-CM

## 2020-10-14 PROCEDURE — 90662 IIV NO PRSV INCREASED AG IM: CPT

## 2020-10-14 PROCEDURE — 99207 PR NO CHARGE INJECTABLE MED/DRUG: CPT

## 2020-10-14 PROCEDURE — G0008 ADMIN INFLUENZA VIRUS VAC: HCPCS

## 2020-10-14 NOTE — NURSING NOTE
Tay Hodges comes into clinic today at the request of Dr. Villanueva Ordering Provider for Flu Vaccine.    See immunization list for administration details.  Patient tolerated injection well.    This service provided today was under the supervising provider of the day Dr. Santos, who was available if needed.    Mary Barreto LPN at 2:11 PM on 10/14/2020.

## 2021-01-11 DIAGNOSIS — R35.1 NOCTURIA: ICD-10-CM

## 2021-01-12 RX ORDER — TAMSULOSIN HYDROCHLORIDE 0.4 MG/1
0.4 CAPSULE ORAL DAILY
Qty: 90 CAPSULE | Refills: 3 | Status: SHIPPED | OUTPATIENT
Start: 2021-01-12 | End: 2021-02-15

## 2021-01-12 NOTE — TELEPHONE ENCOUNTER
tamsulosin (FLOMAX) 0.4 MG capsule   Last Written Prescription Date:  2/13/2020  Last Fill Quantity: 90,   # refills: 3  Last Office Visit : 2/13/2020  Future Office visit:  2/15/2021  90 Tabs, 3 Refills sent to pharm 1/12/2021      Nuria Franco RN  Central Triage Red Flags/Med Refills

## 2021-01-13 ENCOUNTER — MYC MEDICAL ADVICE (OUTPATIENT)
Dept: INTERNAL MEDICINE | Facility: CLINIC | Age: 71
End: 2021-01-13

## 2021-01-14 DIAGNOSIS — I10 BENIGN ESSENTIAL HYPERTENSION: ICD-10-CM

## 2021-01-15 RX ORDER — AMLODIPINE BESYLATE 5 MG/1
5 TABLET ORAL DAILY
Qty: 90 TABLET | Refills: 0 | Status: SHIPPED | OUTPATIENT
Start: 2021-01-15 | End: 2021-02-15

## 2021-02-04 ASSESSMENT — ENCOUNTER SYMPTOMS
DYSURIA: 0
HEMATURIA: 0
DIFFICULTY URINATING: 0
FLANK PAIN: 0

## 2021-02-15 ENCOUNTER — OFFICE VISIT (OUTPATIENT)
Dept: INTERNAL MEDICINE | Facility: CLINIC | Age: 71
End: 2021-02-15
Payer: MEDICARE

## 2021-02-15 VITALS
HEIGHT: 70 IN | BODY MASS INDEX: 26.34 KG/M2 | SYSTOLIC BLOOD PRESSURE: 148 MMHG | WEIGHT: 184 LBS | HEART RATE: 63 BPM | OXYGEN SATURATION: 98 % | DIASTOLIC BLOOD PRESSURE: 89 MMHG

## 2021-02-15 DIAGNOSIS — E03.4 HYPOTHYROIDISM DUE TO ACQUIRED ATROPHY OF THYROID: ICD-10-CM

## 2021-02-15 DIAGNOSIS — R35.1 NOCTURIA: ICD-10-CM

## 2021-02-15 DIAGNOSIS — I10 BENIGN ESSENTIAL HYPERTENSION: ICD-10-CM

## 2021-02-15 DIAGNOSIS — Z12.11 SCREENING FOR COLON CANCER: ICD-10-CM

## 2021-02-15 DIAGNOSIS — Z12.5 SCREENING FOR PROSTATE CANCER: ICD-10-CM

## 2021-02-15 DIAGNOSIS — Z00.00 ROUTINE GENERAL MEDICAL EXAMINATION AT A HEALTH CARE FACILITY: Primary | ICD-10-CM

## 2021-02-15 DIAGNOSIS — E78.5 HYPERLIPIDEMIA, UNSPECIFIED HYPERLIPIDEMIA TYPE: ICD-10-CM

## 2021-02-15 LAB
ALBUMIN SERPL-MCNC: 4.2 G/DL (ref 3.4–5)
ALP SERPL-CCNC: 50 U/L (ref 40–150)
ALT SERPL W P-5'-P-CCNC: 35 U/L (ref 0–70)
ANION GAP SERPL CALCULATED.3IONS-SCNC: <1 MMOL/L (ref 3–14)
AST SERPL W P-5'-P-CCNC: 19 U/L (ref 0–45)
BILIRUB SERPL-MCNC: 0.8 MG/DL (ref 0.2–1.3)
BUN SERPL-MCNC: 22 MG/DL (ref 7–30)
CALCIUM SERPL-MCNC: 9.6 MG/DL (ref 8.5–10.1)
CHLORIDE SERPL-SCNC: 107 MMOL/L (ref 94–109)
CHOLEST SERPL-MCNC: 139 MG/DL
CO2 SERPL-SCNC: 32 MMOL/L (ref 20–32)
CREAT SERPL-MCNC: 0.82 MG/DL (ref 0.66–1.25)
GFR SERPL CREATININE-BSD FRML MDRD: >90 ML/MIN/{1.73_M2}
GLUCOSE SERPL-MCNC: 99 MG/DL (ref 70–99)
HBA1C MFR BLD: 5.8 % (ref 0–5.6)
HDLC SERPL-MCNC: 67 MG/DL
LDLC SERPL CALC-MCNC: 64 MG/DL
NONHDLC SERPL-MCNC: 72 MG/DL
POTASSIUM SERPL-SCNC: 4.5 MMOL/L (ref 3.4–5.3)
PROT SERPL-MCNC: 7.5 G/DL (ref 6.8–8.8)
SODIUM SERPL-SCNC: 140 MMOL/L (ref 133–144)
TRIGL SERPL-MCNC: 42 MG/DL
TSH SERPL DL<=0.005 MIU/L-ACNC: 0.45 MU/L (ref 0.4–4)

## 2021-02-15 PROCEDURE — 83036 HEMOGLOBIN GLYCOSYLATED A1C: CPT | Performed by: PATHOLOGY

## 2021-02-15 PROCEDURE — 80061 LIPID PANEL: CPT | Performed by: PATHOLOGY

## 2021-02-15 PROCEDURE — 99397 PER PM REEVAL EST PAT 65+ YR: CPT | Mod: GY | Performed by: INTERNAL MEDICINE

## 2021-02-15 PROCEDURE — 84443 ASSAY THYROID STIM HORMONE: CPT | Performed by: PATHOLOGY

## 2021-02-15 PROCEDURE — 36415 COLL VENOUS BLD VENIPUNCTURE: CPT | Performed by: PATHOLOGY

## 2021-02-15 PROCEDURE — 80053 COMPREHEN METABOLIC PANEL: CPT | Performed by: PATHOLOGY

## 2021-02-15 RX ORDER — TAMSULOSIN HYDROCHLORIDE 0.4 MG/1
0.4 CAPSULE ORAL DAILY
Qty: 90 CAPSULE | Refills: 3 | Status: SHIPPED | OUTPATIENT
Start: 2021-02-15 | End: 2021-12-14

## 2021-02-15 RX ORDER — LEVOTHYROXINE SODIUM 112 UG/1
112 TABLET ORAL DAILY
Qty: 90 TABLET | Refills: 3 | Status: SHIPPED | OUTPATIENT
Start: 2021-02-15 | End: 2022-01-24

## 2021-02-15 RX ORDER — ATORVASTATIN CALCIUM 80 MG/1
80 TABLET, FILM COATED ORAL DAILY
Qty: 90 TABLET | Refills: 3 | Status: SHIPPED | OUTPATIENT
Start: 2021-02-15 | End: 2022-01-24

## 2021-02-15 RX ORDER — AMLODIPINE BESYLATE 5 MG/1
5 TABLET ORAL DAILY
Qty: 90 TABLET | Refills: 3 | Status: SHIPPED | OUTPATIENT
Start: 2021-02-15 | End: 2022-01-24

## 2021-02-15 ASSESSMENT — ANXIETY QUESTIONNAIRES
IF YOU CHECKED OFF ANY PROBLEMS ON THIS QUESTIONNAIRE, HOW DIFFICULT HAVE THESE PROBLEMS MADE IT FOR YOU TO DO YOUR WORK, TAKE CARE OF THINGS AT HOME, OR GET ALONG WITH OTHER PEOPLE: NOT DIFFICULT AT ALL
6. BECOMING EASILY ANNOYED OR IRRITABLE: NOT AT ALL
5. BEING SO RESTLESS THAT IT IS HARD TO SIT STILL: NOT AT ALL
GAD7 TOTAL SCORE: 0
1. FEELING NERVOUS, ANXIOUS, OR ON EDGE: NOT AT ALL
7. FEELING AFRAID AS IF SOMETHING AWFUL MIGHT HAPPEN: NOT AT ALL
2. NOT BEING ABLE TO STOP OR CONTROL WORRYING: NOT AT ALL
3. WORRYING TOO MUCH ABOUT DIFFERENT THINGS: NOT AT ALL

## 2021-02-15 ASSESSMENT — MIFFLIN-ST. JEOR: SCORE: 1597.93

## 2021-02-15 ASSESSMENT — PATIENT HEALTH QUESTIONNAIRE - PHQ9
5. POOR APPETITE OR OVEREATING: NOT AT ALL
SUM OF ALL RESPONSES TO PHQ QUESTIONS 1-9: 1

## 2021-02-15 NOTE — PROGRESS NOTES
History of Present Illness:  Mr. Hodges is a 71 year old male who presents for  Chief Complaint   Patient presents with     Physical     physical (2nd COVID vaccine 2.24.21)     Walking, exercising, eating well, pretty isolated but overall mental health is holding up  1/27 got Moderna first dose [facility]  Planning to go to [facility] for daughter's wedding in April  Can check BP at home but doesn't, 128-135 typically  tamsulosin helping for urinary urgency, nocturia x1-2  No musculoskeletal, cardiopulmonary, derm, GI, neuro, ENT concerns       Routine Health Maintenance:  Immunizations (zoster, pneumovax, flu, Tdap, Hep A/B):        Most Recent Immunizations   Administered Date(s) Administered     Flu 65+ Years 11/08/2018     Influenza (High Dose) 3 valent vaccine 02/01/2017     Influenza (IIV3) PF 01/20/2016     Pneumo Conj 13-V (2010&after) 02/01/2017     Pneumococcal 23 valent 02/09/2018     TD (ADULT, 7+) 07/01/2004     TDAP Vaccine (Boostrix) 12/19/2012     Zoster vaccine, live 12/19/2012         Lipids:   Recent Labs   Lab Test 02/11/19  1005 02/09/18  0950  01/14/15  1056 12/19/12  0957   CHOL 138 138   < > 149 151   HDL 57 58   < > 67 55   LDL 70 73   < > 74 88   TRIG 57 41   < > 40 38   CHOLHDLRATIO  --   --   --  2.2 2.7    < > = values in this interval not displayed.          PSA (50-75 yrs):   PSA   Date Value Ref Range Status   02/11/2019 2.66 0 - 4 ug/L Final     Comment:     Assay Method:  Chemiluminescence using Siemens Vista analyzer      AAA Screening (65-75 yrs): n/a  Lung Ca Screening (>30 pk age 55-79 or >20 py age 50-79 + RF): n/a  Colonoscopy (50-75 yrs): 3/18 several large TAs, rec 3 years follow up  Dexa (>65W or 70M yrs): n/a  HIV/HCV if risk factors: HCV neg 2017  Safety/Lifestyle: reviewed  Tob/EtOH: reviewed  Depression: screen neg  Advanced Directive:  deferred  Panc MRI: due 2022                    A detailed Review of Systems was performed, verified and is negative  "except as documented in the HPI.  All health questionnaires were reviewed, verified and relevant information documented above.    Past Medical History:  Past Medical History:   Diagnosis Date     Benign liver cyst     hemangioma 3/18, no f/u indicated     Colon polyps     colo due again 3 years (2021)     Coronary atherosclerosis of native coronary artery 12/19/2012    Mild disease seen on angiogram     Hypertension      Low back pain      Nephrolithiasis 12/13/2011     Other and unspecified hyperlipidemia 12/13/2011     Pancreas cyst     small IPMN, imaged 2018, 2019 (stable), next due 2022     Unspecified hypothyroidism 12/13/2011       Past Surgical History:  Past Surgical History:   Procedure Laterality Date     LITHOTRIPSY       partial thyroidectomy         Active Meds:  Current Outpatient Medications   Medication     amLODIPine (NORVASC) 5 MG tablet     aspirin 325 MG tablet     atorvastatin (LIPITOR) 80 MG tablet     ibuprofen (ADVIL/MOTRIN) 200 MG tablet     levothyroxine (SYNTHROID/LEVOTHROID) 112 MCG tablet     tamsulosin (FLOMAX) 0.4 MG capsule     No current facility-administered medications for this visit.         Allergies:  Patient has no known allergies.    Family History:  family history includes Alzheimer Disease (age of onset: 60) in his mother; C.A.D. in his maternal grandfather; Cancer in his father and other family members.    Social History:  Social History     Tobacco Use     Smoking status: Never Smoker     Smokeless tobacco: Never Used   Substance Use Topics     Alcohol use: Yes     Comment: 10     Drug use: Not on file       Physical Exam:  Vitals: BP (!) 149/87 (BP Location: Right arm, Patient Position: Sitting, Cuff Size: Adult Regular)   Pulse 64   Ht 1.765 m (5' 9.5\")   Wt 83.5 kg (184 lb)   SpO2 100%   BMI 26.78 kg/m    Constitutional: Alert, oriented, pleasant, no acute distress  Head: Normocephalic, atraumatic  Eyes: Extra-ocular movements intact, pupils equally round and " reactive bilaterally, no scleral icterus  ENT: Oropharynx clear, moist mucus membranes, good dentition  Neck: Supple, no lymphadenopathy  Cardiovascular: Regular rate and rhythm, no murmurs, rubs or gallops, peripheral pulses full/symmetric  Respiratory: Good air movement bilaterally, lungs clear, no wheezes/rales/rhonchi  GI: Abdomen soft, bowel sounds present, nondistended, nontender, no organomegaly or masses, no rebound/guarding  Musculoskeletal: No edema, normal muscle tone, normal gait  Neurologic: Alert and oriented, cranial nerves 2-12 intact, grossly non-focal  Skin: No rashes/lesions  Psychiatric: normal mentation, affect and mood      Diagnostics:  Labs reviewed in Epic          Assessment and Plan:  Tay was seen today for physical.    Diagnoses and all orders for this visit:    Screening for colon cancer  -     GASTROENTEROLOGY ADULT REF PROCEDURE ONLY; Future    Benign essential hypertension  -     amLODIPine (NORVASC) 5 MG tablet; Take 1 tablet (5 mg) by mouth daily  -     Comprehensive metabolic panel; Future    Hyperlipidemia, unspecified hyperlipidemia type  -     atorvastatin (LIPITOR) 80 MG tablet; Take 1 tablet (80 mg) by mouth daily  -     Lipid Profile FASTING; Future    Hypothyroidism due to acquired atrophy of thyroid  -     levothyroxine (SYNTHROID/LEVOTHROID) 112 MCG tablet; Take 1 tablet (112 mcg) by mouth daily  -     TSH with free T4 reflex; Future    Nocturia  -     tamsulosin (FLOMAX) 0.4 MG capsule; Take 1 capsule (0.4 mg) by mouth daily        Louann Villanueva MD  Internal Medicine      >30 minutes spent today performing chart review, history and exam, documentation and further activities as noted above.          Answers for HPI/ROS submitted by the patient on 2/4/2021   General Symptoms: No  Skin Symptoms: No  HENT Symptoms: No  EYE SYMPTOMS: No  HEART SYMPTOMS: No  LUNG SYMPTOMS: No  INTESTINAL SYMPTOMS: No  URINARY SYMPTOMS: Yes  REPRODUCTIVE SYMPTOMS: No  SKELETAL SYMPTOMS:  No  BLOOD SYMPTOMS: No  NERVOUS SYSTEM SYMPTOMS: No  MENTAL HEALTH SYMPTOMS: No  Trouble holding urine or incontinence: No  Pain or burning: No  Trouble starting or stopping: No  Increased frequency of urination: No  Blood in urine: No  Decreased frequency of urination: No  Frequent nighttime urination: Yes  Flank pain: No  Difficulty emptying bladder: No

## 2021-02-15 NOTE — NURSING NOTE
Chief Complaint   Patient presents with     Physical     physical        Taylor Carr MA, at 7:50 AM on 2/15/2021.

## 2021-02-15 NOTE — PATIENT INSTRUCTIONS
Patient Education   Personalized Prevention Plan  You are due for the preventive services outlined below.  Your care team is available to assist you in scheduling these services.  If you have already completed any of these items, please share that information with your care team to update in your medical record.  Health Maintenance Due   Topic Date Due     Discuss Advance Care Planning  1950     PHQ-2  01/01/2021     FALL RISK ASSESSMENT  02/13/2021     Annual Wellness Visit  02/13/2021     Preventive Health Recommendations  See your health care provider every year to    Review health changes.     Discuss preventive care.      Review your medicines if your doctor has prescribed any.    Talk with your health care provider about whether you should have a test to screen for prostate cancer (PSA).    Every 3 years, have a diabetes test (fasting glucose). If you are at risk for diabetes, you should have this test more often.    Every 5 years, have a cholesterol test. Have this test more often if you are at risk for high cholesterol or heart disease.     Every 10 years, have a colonoscopy. Or, have a yearly FIT test (stool test). These exams will check for colon cancer.    Talk to with your health care provider about screening for Abdominal Aortic Aneurysm if you have a family history of AAA or have a history of smoking.    Shots:     Get a flu shot each year.     Get a tetanus shot every 10 years.     Talk to your doctor about your pneumonia vaccines. There are now two you should receive - Pneumovax (PPSV 23) and Prevnar (PCV 13).    Talk to your pharmacist about a shingles vaccine.     Talk to your doctor about the hepatitis B vaccine.    Nutrition:     Eat at least 5 servings of fruits and vegetables each day.     Eat whole-grain bread, whole-wheat pasta and brown rice instead of white grains and rice.     Get adequate Calcium and Vitamin D.     Lifestyle    Exercise for at least 150 minutes a week (30 minutes  a day, 5 days a week). This will help you control your weight and prevent disease.     Limit alcohol to one drink per day.     No smoking.     Wear sunscreen to prevent skin cancer.     See your dentist every six months for an exam and cleaning.     See your eye doctor every 1 to 2 years to screen for conditions such as glaucoma, macular degeneration and cataracts.    Personalized Prevention Plan  You are due for the preventive services outlined below.  Your care team is available to assist you in scheduling these services.  If you have already completed any of these items, please share that information with your care team to update in your medical record.  Health Maintenance   Topic Date Due     ADVANCE CARE PLANNING  1950     PHQ-2  01/01/2021     FALL RISK ASSESSMENT  02/13/2021     MEDICARE ANNUAL WELLNESS VISIT  02/13/2021     COVID-19 Vaccine (2 of 2 - Moderna series) 02/24/2021     DTAP/TDAP/TD IMMUNIZATION (3 - Td) 12/19/2022     LIPID  02/13/2025     COLORECTAL CANCER SCREENING  03/19/2028     HEPATITIS C SCREENING  Completed     INFLUENZA VACCINE  Completed     Pneumococcal Vaccine: 65+ Years  Completed     ZOSTER IMMUNIZATION  Completed     AORTIC ANEURYSM SCREENING (SYSTEM ASSIGNED)  Completed     Pneumococcal Vaccine: Pediatrics (0 to 5 Years) and At-Risk Patients (6 to 64 Years)  Aged Out     IPV IMMUNIZATION  Aged Out     MENINGITIS IMMUNIZATION  Aged Out     HEPATITIS B IMMUNIZATION  Aged Out

## 2021-02-15 NOTE — NURSING NOTE
Medicare Annual Wellness Visit Previsit note    This 71 year old year old male presents for a  Medicare Wellness Exam.           Medical Care     Have you been to an ER or a hospital in the last year? No  What other specialists or organizations are involved in your medical care?    Current providers sharing in care for this patient include:  Patient Care Team       Relationship Specialty Notifications Start End    Louann Villanueva MD PCP - General Internal Medicine  8/22/17     Phone: [phone] Fax: [phone]         [address] [address]    Louann Villanueva MD Assigned PCP   5/21/20     Phone: [phone] Fax: [phone]         [address] [address]    Angelito Boles MD Assigned Musculoskeletal Provider   10/23/20     Phone: [phone] Fax: [phone]         [address] [facility] [address]                 Social History     Marital Status:  Who lives in your household? wife  Does your home have any of the following safety concerns? Loose rugs in the hallway, no grab bars in the bathroom, no handrails on the stairs or have poorly lit areas?  Yes no grab bars in bathroom  Do you feel threatened or controlled by a partner, ex-partner or anyone in your life? No  Has anyone hurt you physically, for example by pushing, hitting, slapping or kicking you   or forcing you to have sex? No  Do you need help with the phone, transportation, shopping, preparing meals, housework, laundry, medications or managing money? No   Have you noticed any hearing difficulties? No      Risk Behaviors and Healthy Habits     How many servings of fruits and vegetables do you eat a day? 2-3  How often do you exercise and what do you do? 50 minutes 7 days a week  Do you frequently ride without a seatbelt? No  Do you use tobacco?  No  Do you use any other drugs? No       Do you use alcohol?Yes  Number of drinks per day 1  Number of drinking days a week 5-7      Sexual  Health     Are you sexually active? No   If yes, with men, women, or both? Female  If yes, do you more than one current partner?No  If yes, do you use condoms? No  Have you had any sexually transmitted infections? No  Any sexual concerns? No

## 2021-02-16 ENCOUNTER — TELEPHONE (OUTPATIENT)
Dept: GASTROENTEROLOGY | Facility: CLINIC | Age: 71
End: 2021-02-16

## 2021-02-16 ASSESSMENT — ANXIETY QUESTIONNAIRES: GAD7 TOTAL SCORE: 0

## 2021-02-16 NOTE — TELEPHONE ENCOUNTER
1st schedule attempt    Procedure: Lower Endoscopy    Lower Endoscopy Type: Colonoscopy    Purpose of Colonoscopy Procedure: Screening    Colonoscopy Sedation: Conscious/Moderate    Preferred Location: [facility]. Cash in summer 2021          Associated Diagnoses    Screening for colon cancer [Z12.11]  - Primary

## 2021-02-18 ENCOUNTER — TELEPHONE (OUTPATIENT)
Dept: GASTROENTEROLOGY | Facility: CLINIC | Age: 71
End: 2021-02-18

## 2021-02-19 NOTE — TELEPHONE ENCOUNTER
Spoke with Tay in regards to scheduling his colonoscopy. Patient stated that he will call back to schedule at a later time.     Procedure: Lower Endoscopy     Lower Endoscopy Type: Colonoscopy     Purpose of Colonoscopy Procedure: Screening     Colonoscopy Sedation: Conscious/Moderate     Preferred Location: [facility] Cash in summer 2021           Associated Diagnoses     Screening for colon cancer [Z12.11]

## 2021-09-12 ENCOUNTER — HEALTH MAINTENANCE LETTER (OUTPATIENT)
Age: 71
End: 2021-09-12

## 2021-11-26 ENCOUNTER — MYC MEDICAL ADVICE (OUTPATIENT)
Dept: INTERNAL MEDICINE | Facility: CLINIC | Age: 71
End: 2021-11-26
Payer: MEDICARE

## 2021-12-14 ENCOUNTER — MYC MEDICAL ADVICE (OUTPATIENT)
Dept: INTERNAL MEDICINE | Facility: CLINIC | Age: 71
End: 2021-12-14
Payer: MEDICARE

## 2021-12-14 DIAGNOSIS — R35.1 NOCTURIA: ICD-10-CM

## 2021-12-14 RX ORDER — TAMSULOSIN HYDROCHLORIDE 0.4 MG/1
0.4 CAPSULE ORAL DAILY
Qty: 90 CAPSULE | Refills: 0 | Status: SHIPPED | OUTPATIENT
Start: 2021-12-14 | End: 2022-01-24

## 2022-01-20 ENCOUNTER — MYC MEDICAL ADVICE (OUTPATIENT)
Dept: INTERNAL MEDICINE | Facility: CLINIC | Age: 72
End: 2022-01-20
Payer: MEDICARE

## 2022-01-24 ENCOUNTER — LAB (OUTPATIENT)
Dept: LAB | Facility: CLINIC | Age: 72
End: 2022-01-24
Payer: MEDICARE

## 2022-01-24 ENCOUNTER — OFFICE VISIT (OUTPATIENT)
Dept: INTERNAL MEDICINE | Facility: CLINIC | Age: 72
End: 2022-01-24
Payer: MEDICARE

## 2022-01-24 VITALS
DIASTOLIC BLOOD PRESSURE: 81 MMHG | OXYGEN SATURATION: 99 % | SYSTOLIC BLOOD PRESSURE: 144 MMHG | HEART RATE: 69 BPM | HEIGHT: 70 IN | BODY MASS INDEX: 26.61 KG/M2 | WEIGHT: 185.9 LBS

## 2022-01-24 DIAGNOSIS — N40.1 BENIGN PROSTATIC HYPERPLASIA WITH URINARY FREQUENCY: ICD-10-CM

## 2022-01-24 DIAGNOSIS — Z12.11 SCREENING FOR COLON CANCER: ICD-10-CM

## 2022-01-24 DIAGNOSIS — Z12.5 ENCOUNTER FOR SCREENING FOR MALIGNANT NEOPLASM OF PROSTATE: ICD-10-CM

## 2022-01-24 DIAGNOSIS — R97.20 ELEVATED PROSTATE SPECIFIC ANTIGEN (PSA): ICD-10-CM

## 2022-01-24 DIAGNOSIS — E03.4 HYPOTHYROIDISM DUE TO ACQUIRED ATROPHY OF THYROID: ICD-10-CM

## 2022-01-24 DIAGNOSIS — E78.5 HYPERLIPIDEMIA, UNSPECIFIED HYPERLIPIDEMIA TYPE: ICD-10-CM

## 2022-01-24 DIAGNOSIS — N40.1 BENIGN PROSTATIC HYPERPLASIA WITH URINARY FREQUENCY: Primary | ICD-10-CM

## 2022-01-24 DIAGNOSIS — I10 BENIGN ESSENTIAL HYPERTENSION: ICD-10-CM

## 2022-01-24 DIAGNOSIS — Z12.5 SCREENING FOR PROSTATE CANCER: ICD-10-CM

## 2022-01-24 DIAGNOSIS — R35.0 BENIGN PROSTATIC HYPERPLASIA WITH URINARY FREQUENCY: Primary | ICD-10-CM

## 2022-01-24 DIAGNOSIS — R35.0 BENIGN PROSTATIC HYPERPLASIA WITH URINARY FREQUENCY: ICD-10-CM

## 2022-01-24 DIAGNOSIS — K40.90 NON-RECURRENT UNILATERAL INGUINAL HERNIA WITHOUT OBSTRUCTION OR GANGRENE: ICD-10-CM

## 2022-01-24 DIAGNOSIS — R35.1 NOCTURIA: ICD-10-CM

## 2022-01-24 DIAGNOSIS — R93.5 ABNORMAL MRI OF ABDOMEN: ICD-10-CM

## 2022-01-24 LAB
ALBUMIN SERPL-MCNC: 4.4 G/DL (ref 3.4–5)
ALP SERPL-CCNC: 48 U/L (ref 40–150)
ALT SERPL W P-5'-P-CCNC: 39 U/L (ref 0–70)
ANION GAP SERPL CALCULATED.3IONS-SCNC: 6 MMOL/L (ref 3–14)
AST SERPL W P-5'-P-CCNC: 24 U/L (ref 0–45)
BILIRUB SERPL-MCNC: 0.8 MG/DL (ref 0.2–1.3)
BUN SERPL-MCNC: 17 MG/DL (ref 7–30)
CALCIUM SERPL-MCNC: 9.6 MG/DL (ref 8.5–10.1)
CHLORIDE BLD-SCNC: 103 MMOL/L (ref 94–109)
CHOLEST SERPL-MCNC: 153 MG/DL
CO2 SERPL-SCNC: 30 MMOL/L (ref 20–32)
CREAT SERPL-MCNC: 0.9 MG/DL (ref 0.66–1.25)
FASTING STATUS PATIENT QL REPORTED: NORMAL
GFR SERPL CREATININE-BSD FRML MDRD: >90 ML/MIN/1.73M2
GLUCOSE BLD-MCNC: 97 MG/DL (ref 70–99)
HDLC SERPL-MCNC: 60 MG/DL
LDLC SERPL CALC-MCNC: 82 MG/DL
NONHDLC SERPL-MCNC: 93 MG/DL
POTASSIUM BLD-SCNC: 4 MMOL/L (ref 3.4–5.3)
PROT SERPL-MCNC: 7.8 G/DL (ref 6.8–8.8)
PSA SERPL-MCNC: 2.62 UG/L (ref 0–4)
SODIUM SERPL-SCNC: 139 MMOL/L (ref 133–144)
TRIGL SERPL-MCNC: 57 MG/DL
TSH SERPL DL<=0.005 MIU/L-ACNC: 0.93 MU/L (ref 0.4–4)

## 2022-01-24 PROCEDURE — G0103 PSA SCREENING: HCPCS | Performed by: PATHOLOGY

## 2022-01-24 PROCEDURE — 84443 ASSAY THYROID STIM HORMONE: CPT | Performed by: PATHOLOGY

## 2022-01-24 PROCEDURE — 80053 COMPREHEN METABOLIC PANEL: CPT | Performed by: PATHOLOGY

## 2022-01-24 PROCEDURE — 80061 LIPID PANEL: CPT | Performed by: PATHOLOGY

## 2022-01-24 PROCEDURE — 36415 COLL VENOUS BLD VENIPUNCTURE: CPT | Performed by: PATHOLOGY

## 2022-01-24 PROCEDURE — G0439 PPPS, SUBSEQ VISIT: HCPCS | Performed by: INTERNAL MEDICINE

## 2022-01-24 RX ORDER — TAMSULOSIN HYDROCHLORIDE 0.4 MG/1
0.4 CAPSULE ORAL DAILY
Qty: 90 CAPSULE | Refills: 3 | Status: SHIPPED | OUTPATIENT
Start: 2022-01-24 | End: 2022-11-22

## 2022-01-24 RX ORDER — ATORVASTATIN CALCIUM 80 MG/1
80 TABLET, FILM COATED ORAL DAILY
Qty: 90 TABLET | Refills: 3 | Status: SHIPPED | OUTPATIENT
Start: 2022-01-24 | End: 2022-11-22

## 2022-01-24 RX ORDER — AMLODIPINE BESYLATE 5 MG/1
5 TABLET ORAL DAILY
Qty: 90 TABLET | Refills: 3 | Status: SHIPPED | OUTPATIENT
Start: 2022-01-24 | End: 2022-11-22

## 2022-01-24 RX ORDER — FINASTERIDE 5 MG/1
5 TABLET, FILM COATED ORAL DAILY
Qty: 90 TABLET | Refills: 3 | Status: SHIPPED | OUTPATIENT
Start: 2022-01-24 | End: 2022-11-22

## 2022-01-24 RX ORDER — LEVOTHYROXINE SODIUM 112 UG/1
112 TABLET ORAL DAILY
Qty: 90 TABLET | Refills: 3 | Status: SHIPPED | OUTPATIENT
Start: 2022-01-24 | End: 2022-11-22

## 2022-01-24 ASSESSMENT — MIFFLIN-ST. JEOR: SCORE: 1601.55

## 2022-01-24 NOTE — PATIENT INSTRUCTIONS
[facility] scheduling information: [phone] (Imaging); [phone] (Lab).    To schedule an MRI, please call radiology scheduling at [phone].

## 2022-01-24 NOTE — NURSING NOTE
Chief Complaint   Patient presents with     Recheck Medication     Hernia     Possible hernia per patient       Dominic Edmonds, EMT at 2:29 PM on 1/24/2022.

## 2022-01-24 NOTE — PROGRESS NOTES
Medicare Annual Wellness Visit    This 72 year old year old male presents for an subsequent Medicare Wellness Exam.         HPI       Mr. Hodges is overall doing well, apart from concerns for R sided hernia.    HTN: Systolic BP at home in 130s. Checks BP only very intermittently. Has not had any headache, stroke-like symptoms. No episodes of lightheadedness or syncope. He has some worsened vision d/t early cataracts, follows ophthalmology for this.     Nocturia, urinary urgency: Continues to have urinary symptoms. Symptoms improved w/ tamsulosin but did not resolve. Wakes up every 2-3 hrs at night to urinate. Continues to experience urinary urgency but more mild than before medication. Intermittent dribbling of urine. No difficulty initiation stream.     Patient also reports concerns for R sided hernia. For last six months, patient has felt bulge and mild pain in R groin area. Absent when he wakes in the morning, more prominent throughout the day. Pain 1-2/10 at baseline, 4-5/10 with exertion. He is able to push the bulge back in. No severe episodes of pain, no changes to bowel movements, no abdominal pain or distension. No change in appetite.    He has had an overall okay to good mood. In four weeks, he and his wife are visiting their daughter in [place] for two months. His daughter is expecting, due in July and he will be visiting [place] again at that time.    Patient is aware he is overdue for colonoscopy.      Past Medical History:   Diagnosis Date     Benign liver cyst     hemangioma 3/18, no f/u indicated     Colon polyps     colo due again 3 years (2021)     Coronary atherosclerosis of native coronary artery 12/19/2012    Mild disease seen on angiogram     Hypertension      Low back pain      Nephrolithiasis 12/13/2011     Other and unspecified hyperlipidemia 12/13/2011     Pancreas cyst     small IPMN, imaged 2018, 2019 (stable), next due 2022     Unspecified hypothyroidism 12/13/2011       Past Surgical  "History:   Procedure Laterality Date     LITHOTRIPSY       partial thyroidectomy         Family History   Problem Relation Age of Onset     Cancer Other      Cancer Other      Cancer Father         Lung cancer     Alzheimer Disease Mother 60     C.A.D. Maternal Grandfather        Social History     Tobacco Use     Smoking status: Never Smoker     Smokeless tobacco: Never Used   Substance Use Topics     Alcohol use: Yes     Comment: 10     Drug use: Not on file            Medical Care     Have you been to an ER or a hospital in the last year? No  What other specialists or organizations are involved in your medical care?  Ophthalmology, Gastroenterology  Current providers sharing in care for this patient include:  Patient Care Team       Relationship Specialty Notifications Start End    Louann Villanueva MD PCP - General Internal Medicine  8/22/17     Phone: [phone] Fax: [phone]         [address] [address]    Louann Villanueva MD Assigned PCP   2/28/21     Phone: [phone] Fax: [phone]         [address] [address]                 Social History     Marital Status:   Who lives in your household? \"My wife\"  Does your home have any of the following safety concerns? Loose rugs in the hallway, no grab bars in the bathroom, no handrails on the stairs or have poorly lit areas? No  Do you feel threatened or controlled by a partner, ex-partner or anyone in your life? No  Has anyone hurt you physically, for example by pushing, hitting, slapping or kicking you   or forcing you to have sex? No  Do you need help with the phone, transportation, shopping, preparing meals, housework, laundry, medications or managing money? No   Have you noticed any hearing difficulties? No      Risk Behaviors and Healthy Habits     How many servings of fruits and vegetables do you eat a day? 2-3  How often do you exercise and what do you do? 5x a week, walking 30 min-1 hour  Do you " frequently ride without a seatbelt? No  Do you use tobacco? No  Do you use any other drugs? No       Do you use alcohol? Yes, 4-5 drinks per week    PHQ-2 Score:   PHQ-2 ( 1999 Pfizer) 1/24/2022 2/13/2020   Q1: Little interest or pleasure in doing things 0 0   Q2: Feeling down, depressed or hopeless 0 0   PHQ-2 Score 0 0   PHQ-2 Total Score (12-17 Years)- Positive if 3 or more points; Administer PHQ-A if positive - 0   Q1: Little interest or pleasure in doing things - -   Q2: Feeling down, depressed or hopeless - -   PHQ-2 Score - -         Sexual Health     Are you sexually active?  No   If yes, with men, women, or both? N/A  If yes, do you more than one current partner? N/A  If yes, are you using condoms? N/A  Have you had any sexually transmitted infections? No   Any sexual concerns? No     FUNCTIONAL ABILITY/SAFETY SCREENING   **RS to complete Fall Risk, PHQ2 in Assessments prior**    Fall Risk Assessment Today:   Fall Risk Screening:  FALL RISK ASSESSMENT 1/24/2022   Fallen 2 or more times in the past year? No   Any fall with injury in the past year? No       Hearing evaluation if done: No    Visual acuity :not tested    SCREENING FOR PREVENTION and EARLY DETECTION     Advanced Directives: Discussed   **RS to STOP HERE**    Reviewed Immunization Record Today    Most Recent Immunizations   Administered Date(s) Administered     COVID-19,PF,Moderna 11/01/2021     Flu 65+ Years 11/08/2018     Influenza (High Dose) 3 valent vaccine 10/21/2019     Influenza (IIV3) PF 01/20/2016     Influenza, Quad, High Dose, Pf, 65yr+ (Fluzone HD) 10/14/2020     Pneumo Conj 13-V (2010&after) 02/01/2017     Pneumococcal 23 valent 02/09/2018     TD (ADULT, 7+) 07/01/2004     TDAP Vaccine (Boostrix) 12/19/2012     Zoster vaccine recombinant adjuvanted (SHINGRIX) 08/05/2020     Zoster vaccine, live 12/19/2012       Reviewed Health Maintenance Completed and Due    Health Maintenance   Topic Date Due     ADVANCE CARE PLANNING  Never done      PHQ-2  01/01/2022     MEDICARE ANNUAL WELLNESS VISIT  02/15/2022     FALL RISK ASSESSMENT  02/15/2022     DTAP/TDAP/TD IMMUNIZATION (3 - Td or Tdap) 12/19/2022     LIPID  02/15/2026     COLORECTAL CANCER SCREENING  03/19/2028     HEPATITIS C SCREENING  Completed     INFLUENZA VACCINE  Completed     Pneumococcal Vaccine: 65+ Years  Completed     ZOSTER IMMUNIZATION  Completed     AORTIC ANEURYSM SCREENING (SYSTEM ASSIGNED)  Completed     COVID-19 Vaccine  Completed     IPV IMMUNIZATION  Aged Out     MENINGITIS IMMUNIZATION  Aged Out     HEPATITIS B IMMUNIZATION  Aged Out       Health Maintenance Due   Topic Date Due     ADVANCE CARE PLANNING  Never done     PHQ-2  01/01/2022     MEDICARE ANNUAL WELLNESS VISIT  02/15/2022     FALL RISK ASSESSMENT  02/15/2022         CV Risk based on Pooled Cohort Risk:  The 10-year ASCVD risk score (Jakob OLIVEROS Jr., et al., 2013) is: 19.6%    Values used to calculate the score:      Age: 72 years      Sex: Male      Is Non- : No      Diabetic: No      Tobacco smoker: No      Systolic Blood Pressure: 148 mmHg      Is BP treated: Yes      HDL Cholesterol: 67 mg/dL      Total Cholesterol: 139 mg/dL    --------------------------------------------------------------------------------------------------------------------------  --------------------------------------------------------------------------------------------------------------------------         Review of Systems     10 POINT review of systems performed and is negative unless specified above in HPI.    EVALUATION OF COGNITIVE FUNCTION     Mood/affect:Normal  Appearance:Normal  Family member/caregiver input: N/A  Patient cognitive function is intact. Good memory, decision making, maintains routines, is able to function well independently and with wife.          Physical Exam     There were no vitals taken for this visit.  GENERAL APPEARANCE: healthy, alert and no distress  EYES: Eyes grossly normal to  inspection, PERRL and conjunctivae and sclerae normal  HENT: ear canals and TM's normal, nose and mouth without ulcers or lesions, oropharynx clear and oral mucous membranes moist  NECK: no adenopathy, no asymmetry, masses, or scars and thyroid normal to palpation  RESP: lungs clear to auscultation - no rales, rhonchi or wheezes  CV: regular rates and rhythm, normal S1 S2, no S3 or S4, no murmur, click or rub, no peripheral edema and peripheral pulses strong  ABDOMEN: soft, nontender, no hepatosplenomegaly, no masses and bowel sounds normal   (male): R sided inguinal hernia palpable when standing. Reducible.   MS: no musculoskeletal defects are noted and gait is age appropriate without ataxia  SKIN: no suspicious lesions or rashes  NEURO: Normal strength and tone, sensory exam grossly normal, mentation intact and speech normal  PSYCH: mentation appears normal and affect normal/bright          Assessment and Plan     Medicare Wellness Exam    Tay was seen today for recheck medication and hernia.    Diagnoses and all orders for this visit:    Benign prostatic hyperplasia with urinary frequency  -     PSA screen; Future  -     finasteride (PROSCAR) 5 MG tablet; Take 1 tablet (5 mg) by mouth daily    Nocturia  -     tamsulosin (FLOMAX) 0.4 MG capsule; Take 1 capsule (0.4 mg) by mouth daily    Hypothyroidism due to acquired atrophy of thyroid  -     levothyroxine (SYNTHROID/LEVOTHROID) 112 MCG tablet; Take 1 tablet (112 mcg) by mouth daily  -     TSH with free T4 reflex; Future    Hyperlipidemia, unspecified hyperlipidemia type  -     atorvastatin (LIPITOR) 80 MG tablet; Take 1 tablet (80 mg) by mouth daily  -     Comprehensive metabolic panel; Future  -     Lipid panel reflex to direct LDL Fasting; Future    Benign essential hypertension  -     amLODIPine (NORVASC) 5 MG tablet; Take 1 tablet (5 mg) by mouth daily    Elevated prostate specific antigen (PSA)    Screening for prostate cancer    Screening for colon  cancer  -     Adult Gastro Ref - Procedure Only; Future    Abnormal MRI of abdomen  -     MRI Abdomen w/o & w contrast; Future    Non-recurrent unilateral inguinal hernia without obstruction or gangrene  Discussed he can continue to conservatively manage this    Encounter for screening for malignant neoplasm of prostate   -     PSA screen; Future        Options for treatment and follow-up care were reviewed with Tay Hodges and/or guardian engaged in the decision making process and verbalized understanding of the options discussed and agreed with the final plan.    Louann Villanueva MD

## 2022-01-25 NOTE — PROGRESS NOTES
"Medicare Wellness Health Risk Assessment Questionnaire    What is your marital status?      Who lives in your household?  \"My wife\"    Does your home have loose rugs in the hallway:     No    Does your home have grab bars in the bathroom:    No    Does your home have handrails on the stairs?  Yes     Does your home have poorly lit areas?    No    How many times have you fallen in the last year?  0    How many times have you been to the Emergency Room in the last year?  0    How many times have you been hospitalized in the last year?  0    Do you feel threatened or controlled by a partner, ex-partner or anyone in your life?   No    Has anyone hurt you physically, for example by pushing, hitting, slapping or kicking you or forcing you to have sex?   No    Are you sexually active?    No    If yes, with men, women, or both?     N/A    If yes, do you more than one current partner?   N/A    If yes, are you using condoms?    N/A    Have you had any sexually transmitted infections in the last year?   No    Do you have any sexual concerns?    No    Do you need help with the phone, transportation, shopping, preparing meals, housework, laundry, medications or managing money?   No    Have you noticed any hearing difficulties?   No    Do you wear hearing aids?   No    Have you seen a hearing professional such as an audiologist in the last 1 year?   No    Do you have vision difficulty?    No    Do you wear glasses or contacts?   Yes     Have you seen an eye doctor in the last 1 year?   Yes     How many servings of fruits and vegetables do you eat a day?  2-3    How often do you exercise in a week?  5x    What kind of exercise do you do?  Walking 30 min to 1 hour    Do you wear a seatbelt when driving or riding in a vehicle?     Yes     Do you use tobacco?    No    Do you use e-cigarettes?    No    Do you use any other drugs?   No         Do you drink alcohol?   Yes     If you drink alcohol, how many drinks per " week?  4-5    Over the last 2 weeks, how often have you been bothered by feeling down, depressed, or hopeless?  Not at all (0)     Over the last 2 weeks, how often have you had little interest or pleasure in doing things?  Not at all (0)     Have you completed an Advance Directives document?  Yes     If yes, have you given a copy to the clinic?   No    Do you need information on Advance Directives?   No      REBECCA Duque, at 6:24 PM on 1/24/2022.

## 2022-01-31 ENCOUNTER — MYC MEDICAL ADVICE (OUTPATIENT)
Dept: INTERNAL MEDICINE | Facility: CLINIC | Age: 72
End: 2022-01-31
Payer: MEDICARE

## 2022-01-31 DIAGNOSIS — I10 BENIGN ESSENTIAL HYPERTENSION: Primary | ICD-10-CM

## 2022-02-01 RX ORDER — AMLODIPINE BESYLATE 2.5 MG/1
2.5 TABLET ORAL DAILY
Qty: 90 TABLET | Refills: 3 | Status: SHIPPED | OUTPATIENT
Start: 2022-02-01 | End: 2022-11-22

## 2022-05-13 ENCOUNTER — ANCILLARY PROCEDURE (OUTPATIENT)
Dept: MRI IMAGING | Facility: CLINIC | Age: 72
End: 2022-05-13
Attending: INTERNAL MEDICINE
Payer: MEDICARE

## 2022-05-13 DIAGNOSIS — R93.5 ABNORMAL MRI OF ABDOMEN: ICD-10-CM

## 2022-05-13 PROCEDURE — G1004 CDSM NDSC: HCPCS | Mod: GC | Performed by: RADIOLOGY

## 2022-05-13 PROCEDURE — 74183 MRI ABD W/O CNTR FLWD CNTR: CPT | Mod: MG | Performed by: RADIOLOGY

## 2022-05-13 PROCEDURE — A9585 GADOBUTROL INJECTION: HCPCS | Performed by: RADIOLOGY

## 2022-05-13 RX ORDER — GADOBUTROL 604.72 MG/ML
10 INJECTION INTRAVENOUS ONCE
Status: COMPLETED | OUTPATIENT
Start: 2022-05-13 | End: 2022-05-13

## 2022-05-13 RX ADMIN — GADOBUTROL 8.5 ML: 604.72 INJECTION INTRAVENOUS at 09:31

## 2022-05-13 NOTE — DISCHARGE INSTRUCTIONS
MRI Contrast Discharge Instructions    The IV contrast you received today will pass out of your body in your  urine. This will happen in the next 24 hours. You will not feel this process.  Your urine will not change color.    Drink at least 4 extra glasses of water or juice today (unless your doctor  has restricted your fluids). This reduces the stress on your kidneys.  You may take your regular medicines.    If you are on dialysis: It is best to have dialysis today.    If you have a reaction: Most reactions happen right away. If you have  any new symptoms after leaving the hospital (such as hives or swelling),  call your hospital at the correct number below. Or call your family doctor.  If you have breathing distress or wheezing, call 911.    Special instructions: ***    I have read and understand the above information.    Signature:______________________________________ Date:___________    Staff:__________________________________________ Date:___________     Time:__________    [facility] [facility] Departments:    ___[place]: [phone]  ___[facility]: [phone]  ___[place]: [phone] ___[facility]: [phone]  ___[facility]: [phone]  ___[facility]: [phone]  ___[phone]  ___[facility]: [phone]  ___[phone]

## 2022-05-16 ENCOUNTER — MYC MEDICAL ADVICE (OUTPATIENT)
Dept: INTERNAL MEDICINE | Facility: CLINIC | Age: 72
End: 2022-05-16
Payer: MEDICARE

## 2022-05-18 ENCOUNTER — PATIENT OUTREACH (OUTPATIENT)
Dept: SURGERY | Facility: CLINIC | Age: 72
End: 2022-05-18
Payer: MEDICARE

## 2022-05-18 NOTE — PROGRESS NOTES
Patient Telephone Reminder Call    Date of call:  05/18/22  Phone numbers:  Cell number on file:    Telephone Information:   Mobile [phone]       Reached patient/confirmed appointment:  No - left message:   on voicemail  Appointment with:   Dr. Jayme Frank  Reason for visit:  Hernia consult

## 2022-05-18 NOTE — TELEPHONE ENCOUNTER
REFERRAL INFORMATION:    Referring Provider:  Logeais    Referring Clinic:  Internal Medicine    Reason for Visit/Diagnosis: ** RIH - appt per pt.         FUTURE VISIT INFORMATION:    Appointment Date: 5.19.22    Appointment Time: 2:00     NOTES RECORD STATUS  DETAILS   OFFICE NOTE from Referring Provider Internal 5.16.22, 1.24.22  Logeais  Internal Medicine   IMAGING (CT, MRI, US, XR)  Internal 5.13.22  MR Abd

## 2022-05-19 ENCOUNTER — OFFICE VISIT (OUTPATIENT)
Dept: SURGERY | Facility: CLINIC | Age: 72
End: 2022-05-19
Payer: MEDICARE

## 2022-05-19 ENCOUNTER — PRE VISIT (OUTPATIENT)
Dept: SURGERY | Facility: CLINIC | Age: 72
End: 2022-05-19
Payer: MEDICARE

## 2022-05-19 ENCOUNTER — PATIENT OUTREACH (OUTPATIENT)
Dept: SURGERY | Facility: CLINIC | Age: 72
End: 2022-05-19

## 2022-05-19 VITALS
OXYGEN SATURATION: 97 % | HEIGHT: 70 IN | SYSTOLIC BLOOD PRESSURE: 127 MMHG | WEIGHT: 190.1 LBS | DIASTOLIC BLOOD PRESSURE: 73 MMHG | HEART RATE: 71 BPM | BODY MASS INDEX: 27.22 KG/M2

## 2022-05-19 DIAGNOSIS — K40.90 RIGHT INGUINAL HERNIA: Primary | ICD-10-CM

## 2022-05-19 PROCEDURE — 99203 OFFICE O/P NEW LOW 30 MIN: CPT | Performed by: SURGERY

## 2022-05-19 RX ORDER — CEFAZOLIN SODIUM 2 G/50ML
2 SOLUTION INTRAVENOUS
Status: CANCELLED | OUTPATIENT
Start: 2022-05-19

## 2022-05-19 RX ORDER — CEFAZOLIN SODIUM 2 G/50ML
2 SOLUTION INTRAVENOUS SEE ADMIN INSTRUCTIONS
Status: CANCELLED | OUTPATIENT
Start: 2022-05-19

## 2022-05-19 ASSESSMENT — PAIN SCALES - GENERAL: PAINLEVEL: MILD PAIN (2)

## 2022-05-19 NOTE — NURSING NOTE
Pre and Post op Patient Education/Teaching Flowsheet  Relevant Diagnosis:  Inguinal Hernia (K40.90)  Teaching Topic:  Pre and post op teaching  Person(s) Involved in teaching:  Patient     Motivation Level:  Asks Questions:  Yes  Eager to Learn:  Yes  Cooperative:  Yes  Receptive (willing/able to accept information):  Yes  Any cultural factors/Rastafarian beliefs that may influence understanding or compliance?  No    Patient/caregiver/family demonstrates understanding of the following:  Reason for the appointment, diagnosis, and treatment plan:  Yes  Patient demonstrates understanding of the following:  Pre-op bowel prep:  N/A  Post-op pain management recommendations (medications, ice compress, binder/athletic supporter (if applicable), etc.:  Yes  Inguinal hernia patients:  Post-op urinary retention- discussed signs/symptoms and visit to ER for Lafleur catheter placement and to stay in place for at least 48 hours:  Yes  Restrictions:  Yes  Medications to take the day of surgery:  Per PCP  Blood thinner medications discussed and when to stop (if applicable):  Yes  Wound care:  Yes  Diabetes medication management (if applicable):  Per PCP  Which situations necessitate calling provider and whom to contact:  Discussed how to contact the hospital, nurse, and clinic scheduling staff if necessary      Date and time of surgery:  5/26/22  Location of surgery: [facility] [place]- 5th Floor  History and Physical and any other testing necessary prior to surgery:  Yes  Required time line for completion of History and Physical and any pre-op testing:  Yes, Dr. Frank will update H&P on day of surgery  Discuss need for someone to drive patient home and stay with them for 24 hours:  Yes  Pre-op showering/scrub information with Surgical Scrub:  Yes  NPO Guidelines:  NPO per Anesthesia Guidelines  COVID-19 Testing:  Yes, scheduled 5/24/22    Infection Prevention: Patient demonstrates understanding of the  following:  Patient instructed on hand hygiene:  Yes  Surgical procedure site care will be taught and will be reviewed at the time of discharge  Signs and symptoms of infection taught:  Yes  Wound care reviewed and will be taught at the time of discharge  Central venous catheter care will be taught at the time of discharge (if applicable)    Post-op follow-up:  Instructional materials used/given/mailed:  Surgical logistics, post op teaching sheet, and surgical scrub

## 2022-05-19 NOTE — PATIENT INSTRUCTIONS
You met with Dr. Jayme Frank.      Today's visit instructions:    Reminder: Surgery Requirements  Your surgery will be at [facility] [place]- 5th Floor  You will need to arrive 1.5 hours early.   You will need someone to drive you home (over 18 years old) and stay with you for 24 hours after the procedure.  You will need a preop physical with your regular doctor within 30 days of surgery- closer is always better.  Stop any blood thinners, vitamins, minerals, or herbal supplements 5 days before surgery.  If you are taking a prescribed blood thinner please let us know for specific instructions.  Fasting- a nurse from [facility] will call you 1-2 days before surgery to confirm your procedure and tell you when to stop eating and drinking.   Wash with the soap (Antibacterial, Dial Complete Foam, Hibiclense, or soap given/mailed from the clinic) the night before surgery and morning of surgery. See instructions in the Surgery Packet.  You will need to undergo a COVID-19 test 2-4 days prior to your scheduled procedure.  Our surgery scheduler will help arrange testing.  If you would like a procedure estimate please call Cost of Care at [phone].       If you have questions please contact Tamar RN or Alka RN during regular clinic hours, Monday through Friday 7:30 AM - 4:00 PM, or you can contact us via [employer] at anytime.       If you have urgent needs after-hours, weekends, or holidays please call the hospital at [phone] and ask to speak with our on-call General Surgery Team.    Appointment [phone]  Nurse Advice (Tamar or Alka): [phone]   Surgery Scheduler (Jayden): [phone]  Fax: [phone]    After Your Robotic Inguinal Hernia Repair         Incision care   Remove the bandage the day after surgery, but leave the medical tape (Steri-Strips) or glue in place. These will loosen and fall off on their own 1-2 weeks after surgery.   You may take a shower the day  after surgery. Carefully wash your incision with soap and water. Do not submerge yourself in water (bath, whirlpool, hot tub, pool, lake) for 14 days after surgery.     Always wash your hands before touching your incisions or removing bandages.   It is not unusual to form a collection of fluid or blood under your incision that may feel firm or squishy- it can take several weeks to months for your body to reabsorb it.  At times, it may even drain.  If that should happen keep the area clean with soap, water,  and cover with a clean gauze dressing. You can change this daily or as needed.     Other medicines   Wait to start aspirin or blood thinners until the day after surgery. You can continue your regular medicines at your normal time the day after surgery.   Your pain medicine may cause constipation (hard, dry stools). To help with this, take the stool softener your doctor gave you or an over-the-counter stool softener or laxative. You can stop taking this when you are no longer taking pain medicine and your bowel movements are back to normal.      For pain or discomfort   Take the narcotic pain medicine your doctor gave you as needed and as instructed on the bottle. If you prefer to use over-the-counter medication, use acetaminophen (Tylenol) or ibuprofen (Advil, Motrin) as instructed on the box. Do not take Tylenol if it is in your narcotic pain medication.   Use an ice pack on your groin for 20 minutes at a time as needed for the first 24 hours. Be sure to protect your skin by putting a cloth between the ice pack and your skin.   After 24 hours you can switch to heat for 20 minutes as needed. Be sure to protect your skin by putting a cloth between the heat pack and your skin.   It is normal to feel a lump in your groin after surgery. This lump may take up to 8 weeks to go away.   You may experience right shoulder pain after surgery which will go away 1-4 days after your procedure.  This is related to the gas that  was used to inflate your abdomen, it gets trapped between your liver and diaphragm. Walk frequently and apply a heating pad (protecting your skin from the heating pad with a barrier such as a towel).      Activities   No driving until you feel it s safe to do so. Don t drive while taking narcotic pain medicine.   You can return to your other activities as normal, no restrictions.      Special equipment   Male Patients:  We recommend that you wear scrotal support for the first 3 days after surgery; however, you can wear it longer if you wish.  We suggest using an athletic supporter (Jock Strap), snug briefs, or Spandex biker shorts.     Diet   You can eat your regular meals after surgery.      When to call the doctor   Call your doctor if you have:   A fever above 101 F (38.3 C) (taken under the tongue), or a fever or chills lasting more than a day.   Redness at the incision site.   Any fluid or blood draining from the incision, especially if it smells bad.    Severe pain that doesn t improve with pain medicine.      Go to the emergency room if:   You can t urinate (pee) or feel pressure when you urinate. We will place a small, thin tube (catheter) to empty your bladder. This needs to stay in place for at least 2 days.      We will call you 2 to 4 days after surgery to review this handout, answer questions and help arrange after-surgery care. If you have questions or concerns, please call [phone] during regular office hours. If you need to call after business hours, call [phone] and ask to page the surgeon on-call.

## 2022-05-19 NOTE — PROGRESS NOTES
Surgery Scheduled    Date of surgery:  22    Time of Surgery:  12:00pm    Surgeon:  Dr. Jayme Frank    Procedure:  Robotic [facility]    Anesthesia:  General    OR Location:  [facility]- 5th Floor    H&P:  Dr. Frank to update on morning of surgery    COVID Testin22 at [facility]    Bowel Prep:  No     and someone to stay with the patient for 24 hours post procedure:  Yes    Surgery packet mailed/MyChart: Given during clinic visit

## 2022-05-19 NOTE — PROGRESS NOTES
Tay Hodges is a 72 year old male with a 5 month history of a right inguinal mass with the following symptoms of lump and pain.    Onset did not occur with lifting.  Obstructive symptoms:  no  Urinary difficulties:  no  Chronic cough: no  Constipation:  no  Current level of activity:  Medium, walks 3 miles day, retired 5 years, daughter in [place] having baby.    Past medical and surgical history, medications, allergies, family history, and social history were reviewed with the patient.    ROS: 10 point review of systems negative except noted in HPI  PHYSICAL EXAM  General appearance- healthy, alert, and in no distress.  Skin- Skin color and turgor normal.  No obvious rashes.  Neck- Neck is supple without obvious adenopathy.  Lungs- Respiratory effort unlabored.  Gait- Normal.  Abdomen - soft non distended, non tender without obvious masses.  RIH, left some weakness  Impression:  Inguinal hernia, right  Recommendation:  Laparoscopic right Inguinal Hernioplasty Robot assisted, proceed as indicated.    A full discussion regarding the alternatives, risks, goals, and potential complications for this surgery was completed today.  The patient understood I would use non recalled mesh and  that the potential problems included but are not limited to:  Infection, bleeding, hematoma, seroma, recurrence, injury to nerve/muscle or involved testicle(if male), ischemic orchitis(if male), and chronic pain.    The patient verbally expressed understanding, was given the opportunity for questions, and gives full informed consent for the procedure.      Today the patient was instructed on the need for a preoperative H&P, NPO status prior to surgery, and the need to stop anticoagulants prior to surgery.  Additional educational material, soap, and instructions will be mailed out to the patients home.    The total time spent with this patient was 30 minutes.  The total time was spent on the date of encounter doing chart review,  history and physical, dressing changes, documentation, patient education, and any further activity as noted above.

## 2022-05-19 NOTE — NURSING NOTE
"Chief Complaint   Patient presents with     New Patient     [facility]       Vitals:    05/19/22 1355   BP: 127/73   BP Location: Left arm   Patient Position: Sitting   Cuff Size: Adult Regular   Pulse: 71   SpO2: 97%   Weight: 86.2 kg (190 lb 1.6 oz)   Height: 1.765 m (5' 9.5\")       Body mass index is 27.67 kg/m .                          Martina Peres, EMT    "

## 2022-05-19 NOTE — LETTER
May 19, 2022      TO: Tay Hodges  [address] [facility] [address]       SURGERY PACKET            Your surgery is scheduled:    Date: 22  ________________________________    Time: 12:00pm  ________________________________    Please arrive at:  10:30am  ______________________    Surgeon's Name: Dr. Jayme Frank  _______________________  Adult  required upon discharge      Pre-Op Physical Fax Numbers:  [facility] Pre-Admissions  [facility] Fax: [phone] / Phone:  [phone]        Your surgery is located at:  [facility]  [address] [place] [address] - [address] [place] [address]  [phone]     Nurse Line: [phone]   [phone]     *Times are tentative and may change. You can expect a call from the pre-admission nurses to confirm arrival and surgery start time if the times should change.    Surgery: Robotic Right Inguinal Herniorrhaphy    Pre-operative Physical appointment:   Dr. Frank will update your H&P on the morning of surgery.    Pre-operative COVID-19 Test:   Pre-procedure asymptomatic COVID PCR:  22 at 1:30 PM                                                 [address] [place] [address] [address]           Before Your Surgery  For Patients and Visitors at [facility]    Welcome  As you get ready for surgery, you may have a lot of questions.  This brochure will help you know what to expect before and after surgery.  You and your family are the most important members of your health care team.  You will need to take an active role in your care.    Be sure to ask questions and learn all that you can about your surgery.  If you have any safety concerns, we urge you to tell a nurse as soon as possible.   This brochure is for information only.  It does not replace the advice of your doctor.  Always follow your doctor's advice.    Please tell us if you need a .    GETTING READY FOR  SURGERY  Always follow your surgeon's instructions.  If you don't, your surgery could be canceled.  Please use the following checklist.    Within 30 Days of Surgery:    Have a pre-surgery physical exam with your family doctor or partner.    If you use a "[employer] [facility], all of your information from the pre-op physical will be in the [employer] computer system.    Ask the doctor to send all of your results to the hospital before the surgery.  The doctor also may ask you to bring the results with you on the day of surgery or you can fax them to [facility]) Fax: [phone] / Phone:  [phone].    Tell the doctor if:    You are allergic to latex or rubber  (Latex and rubber gloves are often used in medical care).    You are taking any medicines (including aspirin), vitamins (Vitamin E, Fish Oil, Omegas) or herbal products.  You will need to stop taking some medicines before surgery.    You have any medical problems (allergies, diabetes or heart disease, for example).    You have a pacemaker or an AICD (automatic implanted cardiac defibrillator).  If you do, please bring the ID card with you on the day of surgery.    You are a smoker.  People who smoke have a higher risk of infection after surgery.  Ask your doctor how you can quit smoking.    Within 7 days of Surgery:    Pre-register with the hospital.  Please use the hospital's phone number, [phone]. Or, to register online, go to [email or web address].      Prior to your surgical procedure, a nurse will be contacting you to obtain a health history [facility] Fax: [phone] / Phone:  [phone].  Additionally, someone from the Admissions Department will also contact you for preregistration ([phone]).      Call your insurance company.  Ask if you need pre-approval for your surgery.  If you do not have insurance, please let us know.      Arrange for someone to drive you home after surgery.  If you will have  same-day surgery, you may not drive or take public transportation home by yourself.    Arrange for someone to stay with you for 24 hours after you go home.  This person must be a responsible adult (ie- Family member or friend).    The Day Before Surgery:     Call your surgeon if there are any changes in your health.  This includes signs of a cold or flu (such as a sore throat, runny nose, cough, rash or fever).    Do not smoke, drink alcohol or take over-the-counter medicine (unless your surgeon tells you to) for 24 hours before and after surgery.    If you take prescribed drugs:  You may need to stop them until after the surgery.  Follow your doctor's orders.  You may resume Aspirin and/or blood thinners after your surgery as directed by your physician/surgeon.    No solid food after midnight.  Clear liquids only after midnight until 2 hours before your surgery start time. Then, switch to nothing orally. You need to have an empty stomach before surgery.  This will make the surgery as safe as possible.  If you don't follow your doctor's orders, your surgery could be changed to another date.    A nurse will call you within a few days of surgery to go over these and other instructions.    If you do not hear from them, please call them at [facility] Fax: [phone] / Phone:  [phone]    The Day of Surgery:    Take a shower or bath the night before and the morning of surgery.  Use antiseptic soap or the soap your surgeon gave you.  Gently clean the skin.  Do not shave or scrub your surgery site.    Please remove deodorant, cologne, scented lotion, makeup, nail polish and jewelry (including rings and body piercings).  If you wear artificial nails, please remove at least one nail before coming to the hospital.    Wear clean, loose clothing to the hospital.    Bring these items to the hospital:  1. Your insurance card.  2. Money for parking and co-pays (for medicines or the surgery), if needed.    3. A list of all the medicines you take.  Include vitamins, minerals, herbs and over-the-counter drugs.  Note any drug allergies.  4. A copy of your advance health care directive, if you have one.  This tells us what treatment you would want -- and who would make health care decisions -- if you could no longer speak for yourself.  You may request this form in advance or download it from [email or web address].  5. A case for any glasses, contact lenses, hearing aids or dentures.  6. Your inhaler or CPAP machine, if you use these at home.    Leave extra cash, jewelry and other valuables at home.    When You Arrive:  When you get to the hospital, you will:    Check in.  If you are under age 18, you must be with a parent or legal guardian.    Sign consent forms, if you haven't already.  These forms state that you know the risks and benefits of surgery.  When you sign the forms, you give us permission to do the surgery.  Do not sign them unless you understand what will happen during and after your surgery.  If you have any questions about your surgery, ask to speak with your doctor before you sign the forms.  If you don't understand the answers, ask again.    Receive a copy of the Patients Bill of Rights.  If you do not receive a copy, please ask for one.    Change into hospital clothes.  Your belongings will be placed in a bag.  We will return them to you after surgery.    Meet with the anesthesia provider.  He or she will tell you what kind of anesthesia (medicine) will be used to keep you comfortable during surgery.    Remember: It's okay to remind doctors and nurses to wash their hands before touching you.     In most cases, your surgeon will use a marker to write his or her initials on the surgery site.  This ensures that the exact site is operated on.    For safety reasons, we will ask you the same questions many times.  For example, we may ask your name and birth date over and over again.    Please note that  "cell phones are not allowed in some patient care areas.    If you have questions about what will happen in the operating room, talk to your care team.    After Surgery:    We will move you to a recovery room where we will watch you closely.  If you have any pain or discomfort, tell your nurse.  He or she will try to make you comfortable.      If you are staying overnight we will move you to your hospital room after you are awake.    If you are going home we will move you to another room.  The length of time you spend in recovery depends on the type of medicine you received, your medical condition, and the type of surgery you had.    Dealing with pain:  A nurse will check your comfort level often during your stay.  He or she will work with you to manage your pain.  Remember:    All pain is real.  There are many ways to control pain.  We can help you decide what works best for you.    Ask for pain medicine when you need it.  Don't try to \"tough it out\" -- this can make you feel worse.  Always take your medicine as ordered.    Medicine doesn't work the same for everyone.  If your medicine isn't working tell your nurse.  There may be other medicines or treatments we can try.    Going Home:  We will let you know when you're ready to leave the hospital.  Before you leave, we will tell you how to care for yourself at home and prevent infections.  If you do not understand something, please say so.  We will answer any questions you have.  We will then help you get ready to leave.    You must have an adult with you for the first 24 hours after you leave the hospital. Take it easy when you get home.  You will need some time to recover -- you may be more tired than you realize at first.  Rest and relax for at least the first 24 hours at home.  You'll feel better and heal faster if you take good care of yourself.                      Pre-Operative Surgery Scrub    Purpose:   The skin harbors a large variety of bacteria, both " infectious and noninfectious.  Showering with an antiseptic soap prior to an invasive procedure will decrease the number of transient and resident (good and bad) bacteria that is normally found on the skin.    Procedure:      Shower or bathe with 1/2 of the bottle of antiseptic soap (enclosed) the evening before and 1/2 of the bottle the morning of surgery (bathing the day of the procedure is most important).       Apply the soap at full strength (use the entire bottle).  Gently clean the skin, rinse, and dry with a clean towel that is freshly laundered (out of the dryer) and then put on clean clothing that is freshly laundered.        We have given you information regarding pre-op showering.  We recommend that patients wash with an antiseptic soap prior to surgery.  This cleanser will be given to you at the clinic or mailed to your home.  It is advised that you liberally wash the specific area surgery area the night before, and again in the morning before the surgery.  Do not apply lotion afterward.  We would like to keep the skin as clean as possible.    Thank you for following these important instructions.      You have been scheduled for surgery and we would like to give you some information that will assist in helping get the best possible outcome.      Before Surgery:   If for any reason you decide not to have the surgery, please contact our office.  We can easily cancel or reschedule the procedure. Please call the  at [phone].      Any pain related to the surgery that occurs before the surgery needs to be reported and managed by your primary care or referring doctor.      Please keep in mind that the time of surgery is subject to change.  Make sure you have nothing to eat or drink after midnight.  If your surgery is later in the afternoon, this recommendation might change, but not until the day before surgery after the actual time of the surgery has been established.    After Surgery:  When you  are discharged from the recovery room, the nurses will review instructions with you and your caregiver.      Please wash your hands every time you touch the wound or change bandages or dressings.      Do not submerge the wound in water.  You may not use a bathtub or hot tub until the wound is closed.  The wait time frame is generally 2-3 weeks but any open area can be a source of incoming bacteria, so it is better to be on the safe side and avoid the tub until your wound is fully healed.      You may take a shower 24 hours after surgery.  Double check with your surgeon if it is ok for water to run over a wound, whether it has been sutured, stapled, glued or is open.  You may gently wash the wound using the antiseptic soap provided for your pre-surgery showering (do not use a washcloth).  Any mild soap will work as well.      Many surgical wounds will have small white strips of tape on them called Steri Strips.  Do not remove these.  The edges will curl and fall off within 7-10 days with normal showering.      If you are going home with sutures (stitches) or staples, you must return to the clinic to have them taken out, usually within 1-2 weeks.      Signs and symptoms of infection include:  1. Fever, temperature over 101.5 ' F  2. Redness  3. Swelling  4. Increasing pain  5. Green or yellow drainage which may or may not have a foul odor.    Symptoms of infection need to be reported to your surgery office. Please call the nurse at [phone].       Preparing for Your Procedure During the COVID-19 Pandemic    Thank you for choosing [facility] for your health care needs. This is a very challenging time for everyone. The World Health Organization and the State [facility] have declared the COVID-19 virus a pandemic.    Our goal is to keep you and our team here at [facility] safe and healthy. We ve taken several steps to make this happen. For example:    We screen our staff and care teams for  COVID-19.    Everyone at [facility] must wear a mask.    We are limiting hospital and clinic visitors.    Before your surgery or procedure  All patients must get tested for the COVID-19 virus before any surgery or procedure. About a week before your scheduled treatment, you ll get a phone call to set up a testing time. The call may come from a phone number you don t know.    Your test has to happen 1 to 4 days before your treatment. You ll need to travel to a testing station, where we ll take a swab of your nose or throat.    After the test, please stay at home and stay out of contact with other people. It will be harder for you to recover if you get COVID-19 before your surgery.    So, please follow all current safety guidelines, including:    Limit trips outside your home.    Limit the number of people you see.    Always wear a mask outside your home.    Use social distancing. (Stay 6 feet away from others whenever you can.)    Wash your hands often.    If your test shows you have COVID-19  If your test is positive, we ll let you know. A positive test means that you have the virus.  It s likely that we ll have to postpone your surgery or procedure. Your doctor will discuss this with you. After that, we ll let you know what to do and when you can reschedule.  We may have to cancel your surgery or procedure on short notice for other reasons, too.    If your test shows you DON T have COVID-19  Even if your test is negative, you may still get COVID-19. It s rare but, sometimes, the test result is wrong. You could also catch the virus after taking the test.  There s a very small chance that you could catch COVID-19 in the hospital or surgery center. [facility] has taken many steps to prevent this from happening.    Day of your surgery or procedure    Please come wearing a mask or other face covering.    When you arrive, we ll ask you some questions to find out if you have any signs or symptoms of  COVID-19.    One consistent visitor is allowed for adult inpatients, outpatients, and Emergency Department patients.       Adult surgical patients can have one visitor, only before surgery.      Two consistent visitors are allowed for pediatric patients in all areas (this guidance is now extended to outpatients).     We ll share your after-care instructions with you and anyone else you name.    Please call your care team, hospital or surgery center if you have any questions. We thank you for your understanding and for choosing [facility] for your care.     If you or your  are deaf or hard of hearing, or prefer a language other than English, please let us know.  We have many free services, including interpreters and other aids to help you communicate. You may ask for help  through any member of your care team or by calling Language Services at [phone], option 2.

## 2022-05-19 NOTE — LETTER
5/19/2022       RE: Tay Hodges  [address]  [facility] [address]     Dear Colleague,    Thank you for referring your patient, Tay Hodges, to the [facility] [place] at [facility]. Please see a copy of my visit note below.    Tay Hodges is a 72 year old male with a 5 month history of a right inguinal mass with the following symptoms of lump and pain.    Onset did not occur with lifting.  Obstructive symptoms:  no  Urinary difficulties:  no  Chronic cough: no  Constipation:  no  Current level of activity:  Medium, walks 3 miles day, retired 5 years, daughter in [place] having baby.    Past medical and surgical history, medications, allergies, family history, and social history were reviewed with the patient.    ROS: 10 point review of systems negative except noted in HPI  PHYSICAL EXAM  General appearance- healthy, alert, and in no distress.  Skin- Skin color and turgor normal.  No obvious rashes.  Neck- Neck is supple without obvious adenopathy.  Lungs- Respiratory effort unlabored.  Gait- Normal.  Abdomen - soft non distended, non tender without obvious masses.  RIH, left some weakness  Impression:  Inguinal hernia, right  Recommendation:  Laparoscopic right Inguinal Hernioplasty Robot assisted, proceed as indicated.    A full discussion regarding the alternatives, risks, goals, and potential complications for this surgery was completed today.  The patient understood I would use non recalled mesh and  that the potential problems included but are not limited to:  Infection, bleeding, hematoma, seroma, recurrence, injury to nerve/muscle or involved testicle(if male), ischemic orchitis(if male), and chronic pain.    The patient verbally expressed understanding, was given the opportunity for questions, and gives full informed consent for the procedure.      Today the patient was instructed on the need for a  preoperative H&P, NPO status prior to surgery, and the need to stop anticoagulants prior to surgery.  Additional educational material, soap, and instructions will be mailed out to the patients home.    The total time spent with this patient was 30 minutes.  The total time was spent on the date of encounter doing chart review, history and physical, dressing changes, documentation, patient education, and any further activity as noted above.        Sincerely,    Jayme Frank MD

## 2022-05-24 ENCOUNTER — LAB (OUTPATIENT)
Dept: LAB | Facility: CLINIC | Age: 72
End: 2022-05-24

## 2022-05-24 DIAGNOSIS — K40.90 RIGHT INGUINAL HERNIA: ICD-10-CM

## 2022-05-24 LAB — SARS-COV-2 RNA RESP QL NAA+PROBE: NEGATIVE

## 2022-05-24 PROCEDURE — U0003 INFECTIOUS AGENT DETECTION BY NUCLEIC ACID (DNA OR RNA); SEVERE ACUTE RESPIRATORY SYNDROME CORONAVIRUS 2 (SARS-COV-2) (CORONAVIRUS DISEASE [COVID-19]), AMPLIFIED PROBE TECHNIQUE, MAKING USE OF HIGH THROUGHPUT TECHNOLOGIES AS DESCRIBED BY CMS-2020-01-R: HCPCS | Performed by: SURGERY

## 2022-05-25 ENCOUNTER — ANESTHESIA EVENT (OUTPATIENT)
Dept: SURGERY | Facility: AMBULATORY SURGERY CENTER | Age: 72
End: 2022-05-25
Payer: MEDICARE

## 2022-05-25 NOTE — ANESTHESIA PREPROCEDURE EVALUATION
Anesthesia Pre-Procedure Evaluation    Patient: Tay Hodges   MRN: [id number] : 1950        Procedure : Procedure(s):  HERNIORRHAPHY, INGUINAL, RIGHT ROBOT-ASSISTED, proceed as indicated          Past Medical History:   Diagnosis Date     Benign liver cyst     hemangioma 3/18, no f/u indicated     Colon polyps     colo due again 3 years ()     Coronary atherosclerosis of native coronary artery 2012    Mild disease seen on angiogram     Hypertension      Low back pain      Nephrolithiasis 2011     Other and unspecified hyperlipidemia 2011     Pancreas cyst     small IPMN, imaged ,  (stable), next due      Unspecified hypothyroidism 2011      Past Surgical History:   Procedure Laterality Date     LITHOTRIPSY       partial thyroidectomy        No Known Allergies   Social History     Tobacco Use     Smoking status: Never Smoker     Smokeless tobacco: Never Used   Substance Use Topics     Alcohol use: Yes     Comment: 10      Wt Readings from Last 1 Encounters:   22 86.2 kg (190 lb 1.6 oz)        Anesthesia Evaluation            ROS/MED HX  ENT/Pulmonary:  - neg pulmonary ROS     Neurologic:  - neg neurologic ROS     Cardiovascular:     (+) Dyslipidemia hypertension--CAD ---Taking blood thinners     METS/Exercise Tolerance:     Hematologic:  - neg hematologic  ROS     Musculoskeletal:       GI/Hepatic:  - neg GI/hepatic ROS     Renal/Genitourinary:     (+) Nephrolithiasis ,     Endo:     (+) thyroid problem, hypothyroidism,     Psychiatric/Substance Use:  - neg psychiatric ROS     Infectious Disease:  - neg infectious disease ROS     Malignancy:       Other:  - neg other ROS          Physical Exam    Airway  airway exam normal           Respiratory Devices and Support         Dental  no notable dental history         Cardiovascular   cardiovascular exam normal          Pulmonary   pulmonary exam normal                OUTSIDE LABS:  CBC:   Lab Results    Component Value Date    WBC 5.8 01/08/2014    WBC 5.6 01/13/2010    HGB 14.1 01/08/2014    HGB 14.7 01/13/2010    HCT 42.0 01/08/2014    HCT 43.4 01/13/2010     01/08/2014     01/13/2010     BMP:   Lab Results   Component Value Date     01/24/2022     02/15/2021    POTASSIUM 4.0 01/24/2022    POTASSIUM 4.5 02/15/2021    CHLORIDE 103 01/24/2022    CHLORIDE 107 02/15/2021    CO2 30 01/24/2022    CO2 32 02/15/2021    BUN 17 01/24/2022    BUN 22 02/15/2021    CR 0.90 01/24/2022    CR 0.82 02/15/2021    GLC 97 01/24/2022    GLC 99 02/15/2021     COAGS: No results found for: PTT, INR, FIBR  POC: No results found for: BGM, HCG, HCGS  HEPATIC:   Lab Results   Component Value Date    ALBUMIN 4.4 01/24/2022    PROTTOTAL 7.8 01/24/2022    ALT 39 01/24/2022    AST 24 01/24/2022    ALKPHOS 48 01/24/2022    BILITOTAL 0.8 01/24/2022     OTHER:   Lab Results   Component Value Date    A1C 5.8 (H) 02/15/2021    NENA 9.6 01/24/2022    TSH 0.93 01/24/2022    T4 1.22 01/20/2016       Anesthesia Plan    ASA Status:  3   NPO Status:  NPO Appropriate    Anesthesia Type: MAC.     - Reason for MAC: straight local not clinically adequate   Induction: Intravenous.   Maintenance: Balanced.        Consents    Anesthesia Plan(s) and associated risks, benefits, and realistic alternatives discussed. Questions answered and patient/representative(s) expressed understanding.     - Discussed: Risks, Benefits and Alternatives for the PROCEDURE were discussed     - Discussed with:  Patient      - Extended Intubation/Ventilatory Support Discussed: No.      - Patient is DNR/DNI Status: No    Use of blood products discussed: No .     Postoperative Care    Pain management: Multi-modal analgesia.     - Plan for long acting post-op opioid use   PONV prophylaxis: Ondansetron (or other 5HT-3), Background Propofol Infusion     Comments:           H&P reviewed: Unable to attach H&P to encounter due to EHR limitations. H&P Update:  appropriate H&P reviewed, patient examined. No interval changes since H&P (within 30 days).         Pedro Luis Del Cid MD

## 2022-05-26 ENCOUNTER — ANESTHESIA (OUTPATIENT)
Dept: SURGERY | Facility: AMBULATORY SURGERY CENTER | Age: 72
End: 2022-05-26
Payer: MEDICARE

## 2022-05-26 ENCOUNTER — HOSPITAL ENCOUNTER (OUTPATIENT)
Facility: AMBULATORY SURGERY CENTER | Age: 72
Discharge: HOME | End: 2022-05-26
Attending: SURGERY
Payer: MEDICARE

## 2022-05-26 VITALS
RESPIRATION RATE: 14 BRPM | DIASTOLIC BLOOD PRESSURE: 71 MMHG | BODY MASS INDEX: 25.91 KG/M2 | OXYGEN SATURATION: 94 % | WEIGHT: 181 LBS | TEMPERATURE: 97.2 F | SYSTOLIC BLOOD PRESSURE: 114 MMHG | HEART RATE: 70 BPM | HEIGHT: 70 IN

## 2022-05-26 DIAGNOSIS — K40.90 RIGHT INGUINAL HERNIA: ICD-10-CM

## 2022-05-26 PROCEDURE — 49650 LAP ING HERNIA REPAIR INIT: CPT | Mod: RT

## 2022-05-26 PROCEDURE — 49650 LAP ING HERNIA REPAIR INIT: CPT | Mod: RT | Performed by: SURGERY

## 2022-05-26 DEVICE — MESH PROGRIP LAPAROSCOPIC 5.9X3.9" PARIETEX SELF-FIX LPG1510: Type: IMPLANTABLE DEVICE | Site: ABDOMEN | Status: FUNCTIONAL

## 2022-05-26 RX ORDER — OXYCODONE HYDROCHLORIDE 5 MG/1
5 TABLET ORAL EVERY 4 HOURS PRN
Status: DISCONTINUED | OUTPATIENT
Start: 2022-05-26 | End: 2022-05-27 | Stop reason: HOSPADM

## 2022-05-26 RX ORDER — HYDROCODONE BITARTRATE AND ACETAMINOPHEN 5; 325 MG/1; MG/1
1-2 TABLET ORAL EVERY 4 HOURS PRN
Qty: 15 TABLET | Refills: 0 | Status: SHIPPED | OUTPATIENT
Start: 2022-05-26 | End: 2024-03-11

## 2022-05-26 RX ORDER — SODIUM CHLORIDE, SODIUM LACTATE, POTASSIUM CHLORIDE, CALCIUM CHLORIDE 600; 310; 30; 20 MG/100ML; MG/100ML; MG/100ML; MG/100ML
INJECTION, SOLUTION INTRAVENOUS CONTINUOUS
Status: DISCONTINUED | OUTPATIENT
Start: 2022-05-26 | End: 2022-05-27 | Stop reason: HOSPADM

## 2022-05-26 RX ORDER — SODIUM CHLORIDE, SODIUM LACTATE, POTASSIUM CHLORIDE, CALCIUM CHLORIDE 600; 310; 30; 20 MG/100ML; MG/100ML; MG/100ML; MG/100ML
INJECTION, SOLUTION INTRAVENOUS CONTINUOUS
Status: DISCONTINUED | OUTPATIENT
Start: 2022-05-26 | End: 2022-05-26 | Stop reason: HOSPADM

## 2022-05-26 RX ORDER — HYDROMORPHONE HYDROCHLORIDE 1 MG/ML
0.2 INJECTION, SOLUTION INTRAMUSCULAR; INTRAVENOUS; SUBCUTANEOUS EVERY 5 MIN PRN
Status: DISCONTINUED | OUTPATIENT
Start: 2022-05-26 | End: 2022-05-26 | Stop reason: HOSPADM

## 2022-05-26 RX ORDER — NALOXONE HYDROCHLORIDE 0.4 MG/ML
0.4 INJECTION, SOLUTION INTRAMUSCULAR; INTRAVENOUS; SUBCUTANEOUS
Status: DISCONTINUED | OUTPATIENT
Start: 2022-05-26 | End: 2022-05-27 | Stop reason: HOSPADM

## 2022-05-26 RX ORDER — CEFAZOLIN SODIUM 2 G/50ML
2 SOLUTION INTRAVENOUS
Status: COMPLETED | OUTPATIENT
Start: 2022-05-26 | End: 2022-05-26

## 2022-05-26 RX ORDER — DEXAMETHASONE SODIUM PHOSPHATE 4 MG/ML
INJECTION, SOLUTION INTRA-ARTICULAR; INTRALESIONAL; INTRAMUSCULAR; INTRAVENOUS; SOFT TISSUE PRN
Status: DISCONTINUED | OUTPATIENT
Start: 2022-05-26 | End: 2022-05-26

## 2022-05-26 RX ORDER — FENTANYL CITRATE 50 UG/ML
25 INJECTION, SOLUTION INTRAMUSCULAR; INTRAVENOUS EVERY 5 MIN PRN
Status: DISCONTINUED | OUTPATIENT
Start: 2022-05-26 | End: 2022-05-26 | Stop reason: HOSPADM

## 2022-05-26 RX ORDER — FENTANYL CITRATE 50 UG/ML
25-50 INJECTION, SOLUTION INTRAMUSCULAR; INTRAVENOUS
Status: DISCONTINUED | OUTPATIENT
Start: 2022-05-26 | End: 2022-05-26 | Stop reason: HOSPADM

## 2022-05-26 RX ORDER — NALOXONE HYDROCHLORIDE 0.4 MG/ML
0.2 INJECTION, SOLUTION INTRAMUSCULAR; INTRAVENOUS; SUBCUTANEOUS
Status: DISCONTINUED | OUTPATIENT
Start: 2022-05-26 | End: 2022-05-27 | Stop reason: HOSPADM

## 2022-05-26 RX ORDER — GLYCOPYRROLATE 0.2 MG/ML
INJECTION, SOLUTION INTRAMUSCULAR; INTRAVENOUS PRN
Status: DISCONTINUED | OUTPATIENT
Start: 2022-05-26 | End: 2022-05-26

## 2022-05-26 RX ORDER — AMOXICILLIN 250 MG
1-2 CAPSULE ORAL 2 TIMES DAILY
Qty: 30 TABLET | Refills: 0 | Status: SHIPPED | OUTPATIENT
Start: 2022-05-26 | End: 2024-03-11

## 2022-05-26 RX ORDER — ONDANSETRON 2 MG/ML
4 INJECTION INTRAMUSCULAR; INTRAVENOUS EVERY 30 MIN PRN
Status: DISCONTINUED | OUTPATIENT
Start: 2022-05-26 | End: 2022-05-27 | Stop reason: HOSPADM

## 2022-05-26 RX ORDER — PROPOFOL 10 MG/ML
INJECTION, EMULSION INTRAVENOUS CONTINUOUS PRN
Status: DISCONTINUED | OUTPATIENT
Start: 2022-05-26 | End: 2022-05-26

## 2022-05-26 RX ORDER — CEFAZOLIN SODIUM 2 G/50ML
2 SOLUTION INTRAVENOUS SEE ADMIN INSTRUCTIONS
Status: DISCONTINUED | OUTPATIENT
Start: 2022-05-26 | End: 2022-05-26 | Stop reason: HOSPADM

## 2022-05-26 RX ORDER — FLUMAZENIL 0.1 MG/ML
0.2 INJECTION, SOLUTION INTRAVENOUS
Status: DISCONTINUED | OUTPATIENT
Start: 2022-05-26 | End: 2022-05-26 | Stop reason: HOSPADM

## 2022-05-26 RX ORDER — ONDANSETRON 2 MG/ML
INJECTION INTRAMUSCULAR; INTRAVENOUS PRN
Status: DISCONTINUED | OUTPATIENT
Start: 2022-05-26 | End: 2022-05-26

## 2022-05-26 RX ORDER — KETOROLAC TROMETHAMINE 30 MG/ML
INJECTION, SOLUTION INTRAMUSCULAR; INTRAVENOUS PRN
Status: DISCONTINUED | OUTPATIENT
Start: 2022-05-26 | End: 2022-05-26

## 2022-05-26 RX ORDER — PROPOFOL 10 MG/ML
INJECTION, EMULSION INTRAVENOUS PRN
Status: DISCONTINUED | OUTPATIENT
Start: 2022-05-26 | End: 2022-05-26

## 2022-05-26 RX ORDER — BUPIVACAINE HYDROCHLORIDE 5 MG/ML
INJECTION, SOLUTION EPIDURAL; INTRACAUDAL PRN
Status: DISCONTINUED | OUTPATIENT
Start: 2022-05-26 | End: 2022-05-26 | Stop reason: HOSPADM

## 2022-05-26 RX ORDER — LIDOCAINE 40 MG/G
CREAM TOPICAL
Status: DISCONTINUED | OUTPATIENT
Start: 2022-05-26 | End: 2022-05-26 | Stop reason: HOSPADM

## 2022-05-26 RX ORDER — FENTANYL CITRATE 50 UG/ML
INJECTION, SOLUTION INTRAMUSCULAR; INTRAVENOUS PRN
Status: DISCONTINUED | OUTPATIENT
Start: 2022-05-26 | End: 2022-05-26

## 2022-05-26 RX ORDER — ONDANSETRON 4 MG/1
4 TABLET, ORALLY DISINTEGRATING ORAL EVERY 30 MIN PRN
Status: DISCONTINUED | OUTPATIENT
Start: 2022-05-26 | End: 2022-05-27 | Stop reason: HOSPADM

## 2022-05-26 RX ORDER — FENTANYL CITRATE 50 UG/ML
25 INJECTION, SOLUTION INTRAMUSCULAR; INTRAVENOUS
Status: DISCONTINUED | OUTPATIENT
Start: 2022-05-26 | End: 2022-05-27 | Stop reason: HOSPADM

## 2022-05-26 RX ORDER — LIDOCAINE HYDROCHLORIDE 20 MG/ML
INJECTION, SOLUTION INFILTRATION; PERINEURAL PRN
Status: DISCONTINUED | OUTPATIENT
Start: 2022-05-26 | End: 2022-05-26

## 2022-05-26 RX ORDER — MEPERIDINE HYDROCHLORIDE 25 MG/ML
12.5 INJECTION INTRAMUSCULAR; INTRAVENOUS; SUBCUTANEOUS
Status: DISCONTINUED | OUTPATIENT
Start: 2022-05-26 | End: 2022-05-27 | Stop reason: HOSPADM

## 2022-05-26 RX ADMIN — Medication 50 MG: at 10:01

## 2022-05-26 RX ADMIN — PROPOFOL 200 MCG/KG/MIN: 10 INJECTION, EMULSION INTRAVENOUS at 10:02

## 2022-05-26 RX ADMIN — GLYCOPYRROLATE 0.2 MG: 0.2 INJECTION, SOLUTION INTRAMUSCULAR; INTRAVENOUS at 09:53

## 2022-05-26 RX ADMIN — SODIUM CHLORIDE, SODIUM LACTATE, POTASSIUM CHLORIDE, CALCIUM CHLORIDE: 600; 310; 30; 20 INJECTION, SOLUTION INTRAVENOUS at 09:51

## 2022-05-26 RX ADMIN — CEFAZOLIN SODIUM 2 G: 2 SOLUTION INTRAVENOUS at 09:51

## 2022-05-26 RX ADMIN — DEXAMETHASONE SODIUM PHOSPHATE 4 MG: 4 INJECTION, SOLUTION INTRA-ARTICULAR; INTRALESIONAL; INTRAMUSCULAR; INTRAVENOUS; SOFT TISSUE at 09:53

## 2022-05-26 RX ADMIN — KETOROLAC TROMETHAMINE 15 MG: 30 INJECTION, SOLUTION INTRAMUSCULAR; INTRAVENOUS at 10:58

## 2022-05-26 RX ADMIN — ONDANSETRON 4 MG: 2 INJECTION INTRAMUSCULAR; INTRAVENOUS at 09:53

## 2022-05-26 RX ADMIN — FENTANYL CITRATE 100 MCG: 50 INJECTION, SOLUTION INTRAMUSCULAR; INTRAVENOUS at 09:55

## 2022-05-26 RX ADMIN — PROPOFOL 150 MG: 10 INJECTION, EMULSION INTRAVENOUS at 10:01

## 2022-05-26 RX ADMIN — OXYCODONE HYDROCHLORIDE 5 MG: 5 TABLET ORAL at 11:34

## 2022-05-26 RX ADMIN — LIDOCAINE HYDROCHLORIDE 100 MG: 20 INJECTION, SOLUTION INFILTRATION; PERINEURAL at 10:01

## 2022-05-26 RX ADMIN — Medication 0.5 MG: at 10:23

## 2022-05-26 NOTE — DISCHARGE INSTRUCTIONS
"[facility]  Home Care Following Anesthesia  For 24 hours after surgery:  Get plenty of rest.  A responsible adult must stay with you for at least 24 hours after you leave the surgery center.  Do not drive or use heavy equipment.  If you have weakness or tingling, don't drive or use heavy equipment until this feeling goes away.   Do not drink alcohol.   Avoid strenuous or risky activities.  Ask for help when climbing stairs.  You may feel lightheaded.  IF so, sit for a few minutes before standing.  Have someone help you get up.   If you have nausea (feel sick to your stomach): Drink only clear liquids such as apple juice, ginger ale, broth or 7-Up.  Rest may also help.  Be sure to drink enough fluids.  Move to a regular diet as you feel able.   You may have a slight fever.  Call the doctor if your fever is over 100 F (37.7 C) (taken under the tongue) or lasts longer than 24 hours.  You may have a dry mouth, a sore throat, muscle aches or trouble sleeping. These should go away after 24 hours.  Do not make important or legal decisions.   It is recommended to avoid smoking.        Today you received an Exparel block to numb the nerves near your surgery site.  This is a block using local anesthetic or \"numbing\" medication injected around the nerves to anesthetize or \"numb\" the area supplied by those nerves.  This block is injected into the muscle layer near your surgical site.  This medication may numb the location where you had surgery up to 72 hours.  If your surgical site is an arm or leg you should be careful with your affected limb, since it is possible to injure your limb without being aware of it due to the numbing.  Until full feeling returns, you should guard against bumping or hitting your limb, and avoid extreme hot or cold temperatures on the skin.  As the block wears off, the feeling will return as a tingling or prickly sensation near your surgical site.  You will " experince more discomfort from your incision as the feeling returns.  You may want to take a pain pill (a narcotic or Tylenol if this was prescribed by your surgeon) when you start to experience mild pain before the pain beomes more severe.  If your pain medications do not control your pain, you should notify your surgeon.    Tips for taking pain medications  To get the best pain relief possible, remember these points:  Take pain medications as directed, before pain becomes severe.  Pain medication can upset your stomach: taking it with food may help.  Constipation is a common side effect of pain medication. Drink plenty of  fluids.  Eat foods high in fiber. Take a stool softener if recommended by your doctor or pharmacist.  Do not drink alcohol, drive or operate machinery while taking pain medications.  Ask about other ways to control pain, such as with heat, ice or relaxation.    Tylenol/Acetaminophen Consumption  To help encourage the safe use of acetaminophen, the makers of TYLENOL  have lowered the maximum daily dose for single-ingredient Extra Strength TYLENOL  (acetaminophen) products sold in the [place] from 8 pills per day (4,000 mg) to 6 pills per day (3,000 mg). The dosing interval has also changed from 2 pills every 4-6 hours to 2 pills every 6 hours.  If you feel your pain relief is insufficient, you may take Tylenol/Acetaminophen in addition to your narcotic pain medication.   Be careful not to exceed 3,000 mg of Tylenol/Acetaminophen in a 24 hour period from all sources.  If you are taking extra strength Tylenol/acetaminophen (500 mg), the maximum dose is 6 tablets in 24 hours.  If you are taking regular strength acetaminophen (325 mg), the maximum dose is 9 tablets in 24 hours.    Call a doctor for any of the following:  Signs of infection (fever, growing tenderness at the surgery site, a large amount of drainage or bleeding, severe pain, foul-smelling drainage, redness, swelling).  It has been over 8  to 10 hours since surgery and you are still not able to urinate (pass water).  Headache for over 24 hours.  Numbness, tingling or weakness the day after surgery (if you had spinal anesthesia).  Signs of Covid-19 infection (temperature over 100 degrees, shortness of breath, cough, loss of taste/smell, generalized body aches, persistent headache, chills, sore throat, nausea/vomiting/diarrhea)  Your doctor is:  Dr. Jayme Frank, General Surgery: [phone]                  Or dial [phone] and ask for the resident on call for:  General Surgery  For emergency care, call the:  [facility]:  [phone] (TTY for hearing impaired: [phone])

## 2022-05-26 NOTE — ANESTHESIA CARE TRANSFER NOTE
Patient: Tay Hodges    Procedure: Procedure(s):  HERNIORRHAPHY, INGUINAL, RIGHT ROBOT-ASSISTED       Diagnosis: Right inguinal hernia [K40.90]  Diagnosis Additional Information: No value filed.    Anesthesia Type:   General     Note:    Oropharynx: spontaneously breathing and oral airway in place  Level of Consciousness: drowsy  Oxygen Supplementation: face mask  Level of Supplemental Oxygen (L/min / FiO2): 6  Independent Airway: airway patency satisfactory and stable  Dentition: dentition unchanged  Vital Signs Stable: post-procedure vital signs reviewed and stable  Report to RN Given: handoff report given  Patient transferred to: PACU    Handoff Report: Identifed the Patient, Identified the Reponsible Provider, Reviewed the pertinent medical history, Discussed the surgical course, Reviewed Intra-OP anesthesia mangement and issues during anesthesia, Set expectations for post-procedure period and Allowed opportunity for questions and acknowledgement of understanding      Vitals:  Vitals Value Taken Time   BP     Temp     Pulse     Resp     SpO2         Electronically Signed By: CARLOS Ramirez CRNA  May 26, 2022  11:17 AM

## 2022-05-26 NOTE — ANESTHESIA POSTPROCEDURE EVALUATION
Patient: Tay Hodges    Procedure: Procedure(s):  HERNIORRHAPHY, INGUINAL, RIGHT ROBOT-ASSISTED       Anesthesia Type:  General    Note:  Disposition: Outpatient   Postop Pain Control: Uneventful            Sign Out: Well controlled pain   PONV: No   Neuro/Psych: Uneventful            Sign Out: Acceptable/Baseline neuro status   Airway/Respiratory: Uneventful            Sign Out: Acceptable/Baseline resp. status   CV/Hemodynamics: Uneventful            Sign Out: Acceptable CV status; No obvious hypovolemia; No obvious fluid overload   Other NRE: NONE   DID A NON-ROUTINE EVENT OCCUR? No           Last vitals:  Vitals Value Taken Time   /77 05/26/22 1135   Temp 36.2  C (97.2  F) 05/26/22 1135   Pulse 68 05/26/22 1139   Resp 8 05/26/22 1139   SpO2 93 % 05/26/22 1139   Vitals shown include unvalidated device data.    Electronically Signed By: Arnold Tomas MD, MD  May 26, 2022  12:11 PM

## 2022-05-26 NOTE — BRIEF OP NOTE
[facility] Brief Operative Note    Pre-operative diagnosis: Right inguinal hernia [K40.90]   Post-operative diagnosis Same as Pre-op Dx   Procedure: Procedure(s):  HERNIORRHAPHY, INGUINAL, RIGHT ROBOT-ASSISTED   Surgeon: Jayme Frank MD   Assistants(s):    Estimated blood loss: 1 mL    Specimens: no   Findings: yes

## 2022-05-26 NOTE — OP NOTE
Procedure Date: 05/26/2022    PREOPERATIVE DIAGNOSIS:  Right inguinal hernia.    POSTOPERATIVE DIAGNOSIS:  Right inguinal hernia.    OPERATIVE PROCEDURE:  Laparoscopic right inguinal hernioplasty, robot-assisted.    SURGEON:  Jayme Frank MD    ANESTHESIA:  General endotracheal.    INDICATIONS FOR PROCEDURE:  The patient presents with right inguinal hernia.  Informed consent was obtained.    OPERATIVE FINDINGS:  Right inguinal indirect hernia.    PROCEDURE AND FINDINGS:  The patient was brought to the operating room, put under general anesthesia.  Abdomen widely prepped and draped in the usual sterile fashion.  Supraumbilical skin incision was made.  Dissection carried down to the anterior rectus fascia, which was opened, rectus pushed out laterally.  Posterior sheath secured with an 0 Vicryl.  Veress needle introduced into the abdomen was insufflated.  A Radha port was placed here.  Eight ports were placed in left and right lateral per routine and technique.  A Veress needle was placed suprapubic per routine and technique.  The mesh was a 15 x 9 cm Parietex ProGrip mesh placed in abdominal cavity as was 3-0 Stratafix.  At this point, the robot was docked.  Attention turned to the console where the peritoneum was opened into the preperitoneal space, which was opened widely and this allowed for a takedown of a rather fair-sized indirect sac off the cord structures.  Once the space was developed and the myopectineal orifice easily visualized, the mesh was then unrolled to completely cover the orifice with the mesh extending at the midline down to Gaudencio ligament and out laterally.  The peritoneal defect was then closed with running 3-0 Stratafix.  The preperitoneal space was then decompressed using the Veress needle, which showed good placement of the mesh.  The needle was removed under direct vision.  The abdomen deflated, ports were removed.  Fascial defect closed with 0 Vicryl, skin with subcuticular,  Steri-Strips were applied.  Estimated blood loss was minimal.  The patient tolerated the procedure well and was taken to recovery room where he was without difficulty or apparent complication.    Jayme Frank MD        D: 2022   T: 2022   MT: JAHAIRA    Name:     ROBERT OROZCO  MRN:      [id number]        Account:        [id number]   :      1950           Procedure Date: 2022     Document: [id number]

## 2022-05-31 ENCOUNTER — PATIENT OUTREACH (OUTPATIENT)
Dept: SURGERY | Facility: CLINIC | Age: 72
End: 2022-05-31
Payer: MEDICARE

## 2022-05-31 NOTE — PROGRESS NOTES
RN Post-Op/Post-Discharge Care Coordination Note    Mr. Tay Hodges is a 72 year old male who underwent robotic RIH on 5/26 with  Dr. Jayme Frank.  Spoke with Patient.    Support  Patient able to care for self independently     Health Status  Nausea/Vomiting: Patient denies nausea/vomiting.  Eating/drinking: Patient is able to eat and drink without any complaints.  Bowel habits: Patient reports having a normal bowel movement.  Drains (CARLOS): N/A  Fevers/chills: Patient denies any fever or chills.  Incisions: Patient denies any signs and symptoms of infection..  Wound closure:  Steri-strips. Reports scrotal edema and ecchymosis.  Recommended wearing support and may apply heat to the area, protecting his skin from injury.  Pain: Improved.  He has transitioned to OTC analgesics.  New Medications:  Norco, Senna    Activity/Restrictions  No restrictions    Equipment  None    Pathology reviewed with patient:  N/A    Forms/Letters  No    All of his questions were answered including reviewing restrictions and wound care.  He will call this office if he has any further questions and/or concerns.      In lieu of a post-op clinic patient that patient would like to be contacted in approximately 7-10 days via telephone.    A copy of this note was routed to the primary surgeon.      Whom and When to Call  Patient acknowledges understanding of how to manage any medication changes and   when to seek medical care.     Patient advised that if after hour medical concerns arise to please call [phone] and choose option 4 to speak to the physician on call.

## 2022-06-07 ENCOUNTER — PATIENT OUTREACH (OUTPATIENT)
Dept: SURGERY | Facility: CLINIC | Age: 72
End: 2022-06-07
Payer: MEDICARE

## 2022-06-07 NOTE — PROGRESS NOTES
Tay ANGLE Hodges was contacted several days post procedure via telephone for a status update and elected to have a telephone follow -up call approximately 7-10 days after original contact in lieu of a clinic visit with Dr. Jayme Frank.  He continues to do well and the steri-strips  have not peeled off, he may remove in 5 days if still in place.  The patients wounds are healed and the area is C/D/I.  He is afebrile, pain free, and lissette po; the patient is slowly resuming his normal activity.   Discussed restrictions including N/A.    Pathology was reviewed with the patient: N/A    All of Tay BARBOUR Carroll question's were answered and  he would like to return to the clinic on a PRN basis.  The patient is aware that  he may schedule a post op appointment at any time.    A copy of this note was routed to the patients surgeon.

## 2022-09-10 ENCOUNTER — MYC MEDICAL ADVICE (OUTPATIENT)
Dept: INTERNAL MEDICINE | Facility: CLINIC | Age: 72
End: 2022-09-10

## 2022-11-19 ENCOUNTER — HEALTH MAINTENANCE LETTER (OUTPATIENT)
Age: 72
End: 2022-11-19

## 2022-11-22 ENCOUNTER — MYC MEDICAL ADVICE (OUTPATIENT)
Dept: INTERNAL MEDICINE | Facility: CLINIC | Age: 72
End: 2022-11-22

## 2022-11-22 DIAGNOSIS — R35.0 BENIGN PROSTATIC HYPERPLASIA WITH URINARY FREQUENCY: ICD-10-CM

## 2022-11-22 DIAGNOSIS — E03.4 HYPOTHYROIDISM DUE TO ACQUIRED ATROPHY OF THYROID: ICD-10-CM

## 2022-11-22 DIAGNOSIS — I10 BENIGN ESSENTIAL HYPERTENSION: ICD-10-CM

## 2022-11-22 DIAGNOSIS — E78.5 HYPERLIPIDEMIA, UNSPECIFIED HYPERLIPIDEMIA TYPE: ICD-10-CM

## 2022-11-22 DIAGNOSIS — R35.1 NOCTURIA: ICD-10-CM

## 2022-11-22 DIAGNOSIS — N40.1 BENIGN PROSTATIC HYPERPLASIA WITH URINARY FREQUENCY: ICD-10-CM

## 2022-11-22 RX ORDER — TAMSULOSIN HYDROCHLORIDE 0.4 MG/1
0.4 CAPSULE ORAL DAILY
Qty: 90 CAPSULE | Refills: 0 | Status: SHIPPED | OUTPATIENT
Start: 2022-11-22 | End: 2023-03-03

## 2022-11-22 RX ORDER — LEVOTHYROXINE SODIUM 112 UG/1
112 TABLET ORAL DAILY
Qty: 90 TABLET | Refills: 0 | Status: SHIPPED | OUTPATIENT
Start: 2022-11-22 | End: 2023-03-03

## 2022-11-22 RX ORDER — AMLODIPINE BESYLATE 5 MG/1
5 TABLET ORAL DAILY
Qty: 90 TABLET | Refills: 0 | Status: SHIPPED | OUTPATIENT
Start: 2022-11-22 | End: 2023-03-03

## 2022-11-22 RX ORDER — ATORVASTATIN CALCIUM 80 MG/1
80 TABLET, FILM COATED ORAL DAILY
Qty: 90 TABLET | Refills: 0 | Status: SHIPPED | OUTPATIENT
Start: 2022-11-22 | End: 2023-03-03

## 2022-11-22 RX ORDER — FINASTERIDE 5 MG/1
5 TABLET, FILM COATED ORAL DAILY
Qty: 90 TABLET | Refills: 0 | Status: SHIPPED | OUTPATIENT
Start: 2022-11-22 | End: 2023-03-03

## 2022-11-22 RX ORDER — AMLODIPINE BESYLATE 2.5 MG/1
2.5 TABLET ORAL DAILY
Qty: 90 TABLET | Refills: 0 | Status: SHIPPED | OUTPATIENT
Start: 2022-11-22 | End: 2023-03-03

## 2023-02-27 ENCOUNTER — LAB (OUTPATIENT)
Dept: LAB | Facility: CLINIC | Age: 73
End: 2023-02-27
Payer: MEDICARE

## 2023-02-27 ENCOUNTER — OFFICE VISIT (OUTPATIENT)
Dept: INTERNAL MEDICINE | Facility: CLINIC | Age: 73
End: 2023-02-27
Payer: MEDICARE

## 2023-02-27 VITALS
SYSTOLIC BLOOD PRESSURE: 130 MMHG | BODY MASS INDEX: 27.45 KG/M2 | DIASTOLIC BLOOD PRESSURE: 83 MMHG | HEART RATE: 62 BPM | OXYGEN SATURATION: 98 % | WEIGHT: 188.6 LBS

## 2023-02-27 DIAGNOSIS — E03.4 HYPOTHYROIDISM DUE TO ACQUIRED ATROPHY OF THYROID: ICD-10-CM

## 2023-02-27 DIAGNOSIS — R35.0 BENIGN PROSTATIC HYPERPLASIA WITH URINARY FREQUENCY: ICD-10-CM

## 2023-02-27 DIAGNOSIS — R05.8 POST-VIRAL COUGH SYNDROME: Primary | ICD-10-CM

## 2023-02-27 DIAGNOSIS — Z00.00 ENCOUNTER FOR MEDICARE ANNUAL WELLNESS EXAM: ICD-10-CM

## 2023-02-27 DIAGNOSIS — I10 BENIGN ESSENTIAL HYPERTENSION: ICD-10-CM

## 2023-02-27 DIAGNOSIS — N40.1 BENIGN PROSTATIC HYPERPLASIA WITH URINARY FREQUENCY: ICD-10-CM

## 2023-02-27 DIAGNOSIS — Z00.00 ROUTINE GENERAL MEDICAL EXAMINATION AT A HEALTH CARE FACILITY: ICD-10-CM

## 2023-02-27 DIAGNOSIS — E78.5 HYPERLIPIDEMIA, UNSPECIFIED HYPERLIPIDEMIA TYPE: ICD-10-CM

## 2023-02-27 DIAGNOSIS — Z12.11 SCREENING FOR COLON CANCER: ICD-10-CM

## 2023-02-27 LAB
ALBUMIN SERPL BCG-MCNC: 4.6 G/DL (ref 3.5–5.2)
ALP SERPL-CCNC: 52 U/L (ref 40–129)
ALT SERPL W P-5'-P-CCNC: 35 U/L (ref 10–50)
ANION GAP SERPL CALCULATED.3IONS-SCNC: 10 MMOL/L (ref 7–15)
AST SERPL W P-5'-P-CCNC: 26 U/L (ref 10–50)
BILIRUB SERPL-MCNC: 0.6 MG/DL
BUN SERPL-MCNC: 20.6 MG/DL (ref 8–23)
CALCIUM SERPL-MCNC: 10.1 MG/DL (ref 8.8–10.2)
CHLORIDE SERPL-SCNC: 102 MMOL/L (ref 98–107)
CHOLEST SERPL-MCNC: 162 MG/DL
CREAT SERPL-MCNC: 0.83 MG/DL (ref 0.67–1.17)
DEPRECATED HCO3 PLAS-SCNC: 29 MMOL/L (ref 22–29)
GFR SERPL CREATININE-BSD FRML MDRD: >90 ML/MIN/1.73M2
GLUCOSE SERPL-MCNC: 115 MG/DL (ref 70–99)
HDLC SERPL-MCNC: 65 MG/DL
LDLC SERPL CALC-MCNC: 86 MG/DL
NONHDLC SERPL-MCNC: 97 MG/DL
POTASSIUM SERPL-SCNC: 4.8 MMOL/L (ref 3.4–5.3)
PROT SERPL-MCNC: 7.7 G/DL (ref 6.4–8.3)
SODIUM SERPL-SCNC: 141 MMOL/L (ref 136–145)
TRIGL SERPL-MCNC: 54 MG/DL
TSH SERPL DL<=0.005 MIU/L-ACNC: 1.16 UIU/ML (ref 0.3–4.2)

## 2023-02-27 PROCEDURE — G0439 PPPS, SUBSEQ VISIT: HCPCS | Performed by: INTERNAL MEDICINE

## 2023-02-27 PROCEDURE — 80061 LIPID PANEL: CPT | Performed by: PATHOLOGY

## 2023-02-27 PROCEDURE — 84443 ASSAY THYROID STIM HORMONE: CPT | Performed by: PATHOLOGY

## 2023-02-27 PROCEDURE — 36415 COLL VENOUS BLD VENIPUNCTURE: CPT | Performed by: PATHOLOGY

## 2023-02-27 PROCEDURE — 80053 COMPREHEN METABOLIC PANEL: CPT | Performed by: PATHOLOGY

## 2023-02-27 NOTE — PATIENT INSTRUCTIONS
Try checking your blood pressure at home.  Can do it serially, three times in a row, to overcome measurement anxiety. Can check 1-2 times weekly for a month or so.  Check while you are feeling relaxed, not after heavy exertion or stress.  If consistently over 130/80, please let me know.      Can update tetanus shot at pharmacy.

## 2023-02-27 NOTE — PROGRESS NOTES
"Medicare Annual Wellness Visit    This 73 year old year old male presents for an subsequent Medicare Wellness Exam.         ONESIMO Cross had lap R hernia repair in May 2022.  He has been traveling to [place] and [place] to visit family.  Had a cold and still has cough 4 weeks later.  It's dry, comes in \"jags\".  No dyspnea, no sore throat currently, no post nasal gtt.  Not taking anything.  Not keeping him up at night.    Nocturia 2 times per night, improved with meds.   BP borderline high.  No thyroid concerns.   We discussed colonoscopy screening; he doesn't want to do any more of these. Feels disinclined to do invasive procedures given age.       Routine Health Maintenance:  Immunizations (zoster, pneumovax, flu, Tdap, Hep A/B):        Most Recent Immunizations   Administered Date(s) Administered     Flu 65+ Years 11/08/2018     Influenza (High Dose) 3 valent vaccine 02/01/2017     Influenza (IIV3) PF 01/20/2016     Pneumo Conj 13-V (2010&after) 02/01/2017     Pneumococcal 23 valent 02/09/2018     TD (ADULT, 7+) 07/01/2004     TDAP Vaccine (Boostrix) 12/19/2012     Zoster vaccine, live 12/19/2012         Lipids:   Recent Labs   Lab Test 01/24/22  1548 02/15/21  0850 01/20/16  1123 01/14/15  1056   CHOL 153 139   < > 149   HDL 60 67   < > 67   LDL 82 64   < > 74   TRIG 57 42   < > 40   CHOLHDLRATIO  --   --   --  2.2    < > = values in this interval not displayed.     The 10-year ASCVD risk score (Zuhair SIDHU, et al., 2019) is: 18.8%    Values used to calculate the score:      Age: 73 years      Sex: Male      Is Non- : No      Diabetic: No      Tobacco smoker: No      Systolic Blood Pressure: 130 mmHg      Is BP treated: Yes      HDL Cholesterol: 60 mg/dL      Total Cholesterol: 153 mg/dL       PSA (50-75 yrs):   PSA   Date Value Ref Range Status   02/13/2020 3.72 0 - 4 ug/L Final     Comment:     Assay Method:  Chemiluminescence using Siemens Vista analyzer     Prostate Specific Antigen Screen "   Date Value Ref Range Status   01/24/2022 2.62 0.00 - 4.00 ug/L Final       AAA Screening (65-75 yrs): n/a  Lung Ca Screening (>30 pk age 55-79 or >20 py age 50-79 + RF): n/a  Colonoscopy (50-75 yrs): 3/18 several large TAs, rec 3 years follow up, due (discussed in detail and declined by patient; also declined stool based testing)  Dexa (>65W or 70M yrs): n/a  HIV/HCV if risk factors: HCV neg 2017  Safety/Lifestyle: reviewed  Tob/EtOH: reviewed  Depression: screen neg  Advanced Directive:  deferred  Panc MRI: done 2022, stable, consider 2-3 years follow up      Past Medical History:   Diagnosis Date     Benign liver cyst     hemangioma 3/18, no f/u indicated     Colon polyps     colo due again 3 years (2021)     Coronary atherosclerosis of native coronary artery 12/19/2012    Mild disease seen on angiogram     Hypertension      Low back pain      Nephrolithiasis 12/13/2011     Other and unspecified hyperlipidemia 12/13/2011     Pancreas cyst     small IPMN, imaged 2018, 2019 (stable), next due 2022     Unspecified hypothyroidism 12/13/2011       Past Surgical History:   Procedure Laterality Date     DAVINCI HERNIORRHAPHY INGUINAL Right 5/26/2022    Procedure: HERNIORRHAPHY, INGUINAL, RIGHT ROBOT-ASSISTED;  Surgeon: Jayme Frank MD;  Location: [facility] OR     LITHOTRIPSY       partial thyroidectomy         Family History   Problem Relation Age of Onset     Cancer Other      Cancer Other      Cancer Father         Lung cancer     Alzheimer Disease Mother 60     C.A.D. Maternal Grandfather        Social History     Tobacco Use     Smoking status: Never     Smokeless tobacco: Never   Substance Use Topics     Alcohol use: Yes     Comment: 10              Medical Care     Have you been to an ER or a hospital in the last year? No  What other specialists or organizations are involved in your medical care?    Current providers sharing in care for this patient include:  Patient Care Team       Relationship Specialty  Notifications Start End    Louann Villanueva MD PCP - General Internal Medicine  8/22/17     Phone: [phone] Fax: [phone]         [address] [address]    Louann Villanueva MD Assigned PCP   2/28/21     Phone: [phone] Fax: [phone]         [address] [address]    Jayme Frank MD Assigned Surgical Provider   5/28/22     Phone: [phone] Fax: [phone]         [address] [facility] [address]        Travel Screening     Question 2/20/2023 10:13 AM CST - Filed by Patient    Do you have any of the following new or worsening symptoms? Cough    In the last 10 days, have you been in contact with someone who was confirmed or suspected to have Coronavirus / COVID-19? No / Unsure    Have you had a COVID-19 viral test in the last 10 days? No      [facility] Review Of Systems     Question 2/20/2023 10:15 AM CST - Filed by Patient    I understand that completing this form is intended to provide my doctor and/or care team with helpful information for my upcoming clinic visit. It is not to notify my doctor and/or care team of medical matters requiring urgent attention. If I have an urgent medical matter, I should call 911 or my doctor's office. Acknowledge    GENERAL HEALTH SYMPTOMS No    SKIN SYMPTOMS No    HEAD, EARS, NOSE AND THROAT SYMPTOMS No    EYE SYMPTOMS No    HEART SYMPTOMS No    LUNG SYMPTOMS No    INTESTINAL SYMPTOMS No    URINARY SYMPTOMS No    REPRODUCTIVE SYMPTOMS No    SKELETAL SYMPTOMS No    BLOOD SYMPTOMS No    NERVOUS SYSTEM SYMPTOMS No    MENTAL HEALTH SYMPTOMS No      [facility] (Daily Health Habits )     Question 2/20/2023 10:19 AM CST - Filed by Patient    Do you have a special diet?  None    Do you have a snack between meals or at bedtime?  Yes    How many servings of fruit do you consume daily? (1 serving = half cup) 3 - 4 Servings    How many servings of vegetables do you consume daily? (1 servings = half cup)  3  - 4 Servings    How many servings of high fiber or whole grain foods do you consume daily? (1 serving = 1 slice of whole wheat bread, 1 cup of whole grain cereal, or   cup brown rice or oatmeal)  0 - 2 Servings    How many servings of fried or high-fat foods do you consume daily? (Examples: fried chicken, potato chips, doughnuts)  0 - 2 servings    How many sugar-sweetened (not diet) beverages do you consume daily?  0 - 2 Servings    Do you get at least 3 servings of foods that have calcium each day (dairy, green leafy vegetables, etc)?   Yes    Do you take a Vitamin D supplement?  No    On average, how many days per week do you engage in moderate to strenuous exercise like a brisk walk? 3    Do you have any problems taking medications regularly?  No    How often is stress a problem for you in handling such things as your health, finances, relationships, or work?  Never or rarely    How often do you get the social and emotional support you need?  Usually    In a typical week, how many times do you talk on the phone or get together with friends or family? sometimes    In a typical week, how many times do you attend events like Nondenominational/Faith services, club meetings, or other organizational meetings? never    Do you have sleep apnea, excessive snoring or daytime drowsiness? (select all that apply) No    In the past 7 days, how many days did you drink alcohol? 4    On days when you drank alcohol, how often did you have 5 or more alcoholic drinks on one occasion? Never    Do you ever drive after drinking, or ride with a  who has been drinking?  No    Have you used a smokeless tobacco product? No    Have you smoked 100 cigarettes or more in your entire life? Yes    Do you always fasten your seat belt when you are in a car?  Yes    Annual wellness visits     In general, would you say your health is: Very good    How would you describe the condition of your mouth and teeth - including false teeth or dentures? Very  good    In the past 7 days, did you need help from others to perform everyday activities such as eating, getting dressed, grooming, bathing, walking, or using the toilet?  No    In the past 7 days, did you need help from others to take care of things such as laundry and housekeeping, banking, shopping, using the telephone, food preparation, transportation, or taking your own medications?  No      Exercise Activity     Question 2/20/2023 10:19 AM CST - Filed by Patient    How intense is your typical exercise? Moderate (like brisk walking)    On average how many minutes do you engage in exercise at this level? 30    Minutes per week doing moderate to strenuous exercise: (range: 0 - 29905) 0      [facility] Smoking Follow Up     Question 2/20/2023 10:19 AM CST - Filed by Patient    How often do you smoke cigarettes? Not at all            PHQ-2 Score:    PHQ-2 Score:     PHQ-2 ( 1999 Pfizer) 1/24/2022 2/13/2020   Q1: Little interest or pleasure in doing things 0 0   Q2: Feeling down, depressed or hopeless 0 0   PHQ-2 Score 0 0   PHQ-2 Total Score (12-17 Years)- Positive if 3 or more points; Administer PHQ-A if positive - 0   Q1: Little interest or pleasure in doing things - -   Q2: Feeling down, depressed or hopeless - -   PHQ-2 Score - -         FUNCTIONAL ABILITY/SAFETY SCREENING   **RS to complete Fall Risk, PHQ2 in Assessments prior**    Fall Risk Assessment Today:   Fall Risk Screening:  FALL RISK ASSESSMENT 1/24/2022   Fallen 2 or more times in the past year? No   Any fall with injury in the past year? No         Hearing evaluation if done: No    Visual acuity : Wears glasses, follows with ophtho    SCREENING FOR PREVENTION and EARLY DETECTION     Advanced Directives: Discussed and patient desires to to have further information and discuss with family.      **RS to STOP HERE**    Reviewed Immunization Record Today    Most Recent Immunizations   Administered Date(s) Administered     COVID-19 Vaccine 18+  (Moderna) 11/01/2021     Flu 65+ Years 11/08/2018     Influenza (High Dose) 3 valent vaccine 10/21/2019     Influenza (IIV3) PF 01/20/2016     Influenza Vaccine 65+ (Fluzone HD) 10/14/2020     Pneumo Conj 13-V (2010&after) 02/01/2017     Pneumococcal 23 valent 02/09/2018     TD (ADULT, 7+) 07/01/2004     TDAP Vaccine (Boostrix) 12/19/2012     Zoster vaccine recombinant adjuvanted (SHINGRIX) 08/05/2020     Zoster vaccine, live 12/19/2012       Reviewed Health Maintenance Completed and Due    Health Maintenance   Topic Date Due     ADVANCE CARE PLANNING  Never done     DTAP/TDAP/TD IMMUNIZATION (3 - Td or Tdap) 12/19/2022     PHQ-2 (once per calendar year)  01/01/2023     MEDICARE ANNUAL WELLNESS VISIT  01/24/2023     TSH W/FREE T4 REFLEX  01/24/2023     FALL RISK ASSESSMENT  01/24/2023     LIPID  01/24/2027     COLORECTAL CANCER SCREENING  03/19/2028     HEPATITIS C SCREENING  Completed     INFLUENZA VACCINE  Completed     Pneumococcal Vaccine: 65+ Years  Completed     ZOSTER IMMUNIZATION  Completed     AORTIC ANEURYSM SCREENING (SYSTEM ASSIGNED)  Completed     COVID-19 Vaccine  Completed     IPV IMMUNIZATION  Aged Out     MENINGITIS IMMUNIZATION  Aged Out       Health Maintenance Due   Topic Date Due     ADVANCE CARE PLANNING  Never done     DTAP/TDAP/TD IMMUNIZATION (3 - Td or Tdap) 12/19/2022     PHQ-2 (once per calendar year)  01/01/2023     MEDICARE ANNUAL WELLNESS VISIT  01/24/2023     TSH W/FREE T4 REFLEX  01/24/2023     FALL RISK ASSESSMENT  01/24/2023         CV Risk based on Pooled Cohort Risk:  The 10-year ASCVD risk score (Zuhair SIDHU, et al., 2019) is: 18.8%    Values used to calculate the score:      Age: 73 years      Sex: Male      Is Non- : No      Diabetic: No      Tobacco smoker: No      Systolic Blood Pressure: 130 mmHg      Is BP treated: Yes      HDL Cholesterol: 60 mg/dL      Total Cholesterol: 153  mg/dL    --------------------------------------------------------------------------------------------------------------------------  --------------------------------------------------------------------------------------------------------------------------         Review of Systems     10 POINT review of systems performed and is negative unless specified above in HPI.    EVALUATION OF COGNITIVE FUNCTION     Mood/affect:Normal  Appearance:Normal    Mini Cog Scoring   N/a grossly normal           Physical Exam     /83 (BP Location: Right arm, Patient Position: Sitting, Cuff Size: Adult Regular)   Pulse 62   Wt 85.5 kg (188 lb 9.6 oz)   SpO2 98%   BMI 27.45 kg/m    Constitutional: Alert, oriented, pleasant, no acute distress  Head: Normocephalic, atraumatic  Eyes: Extra-ocular movements intact, pupils equally round and reactive bilaterally, no scleral icterus  ENT: Oropharynx clear, moist mucus membranes, good dentition  Neck: Supple, no lymphadenopathy, thyroid surgically absent  Cardiovascular: Regular rate and rhythm, no murmurs, rubs or gallops, peripheral pulses full/symmetric  Respiratory: Good air movement bilaterally, lungs clear, no wheezes/rales/rhonchi  GI: Abdomen soft, bowel sounds present, nondistended, nontender, no organomegaly or masses, no rebound/guarding  Musculoskeletal: No edema, normal muscle tone, normal gait  Neurologic: Alert and oriented, cranial nerves 2-12 intact, strength 5/5 throughout  Psychiatric: normal mentation, affect and mood          Assessment and Plan     Medicare Wellness Exam    Tay was seen today for physical and medication refill.    Diagnoses and all orders for this visit:    Post-viral cough syndrome  Normal exam, slowly improved, declines pharmaceuticals today.    Hyperlipidemia, unspecified hyperlipidemia type  -     Lipid panel reflex to direct LDL Fasting; Future    Benign prostatic hyperplasia with urinary frequency  Stable.    Hypothyroidism due to  acquired atrophy of thyroid  -     TSH with free T4 reflex; Future    Screening for colon cancer  Discussed with patient, he understands he had polyps in 2018 and he is overdue for recommended interval screenings, understands the risk of developing colon cancer.  He declines CRC screening today (scope or stool based testing).    Benign essential hypertension  -     Comprehensive metabolic panel (BMP + Alb, Alk Phos, ALT, AST, Total. Bili, TP); Future    Routine general medical examination at a [facility] care facility  Encounter for Medicare annual wellness exam  -     Comprehensive metabolic panel (BMP + Alb, Alk Phos, ALT, AST, Total. Bili, TP); Future        Options for treatment and follow-up care were reviewed with Tay Hodges and/or guardian engaged in the decision making process and verbalized understanding of the options discussed and agreed with the final plan.    Louann Villanueva MD    Answers for HPI/ROS submitted by the patient on 2/20/2023  General Symptoms: No  Skin Symptoms: No  HENT Symptoms: No  EYE SYMPTOMS: No  HEART SYMPTOMS: No  LUNG SYMPTOMS: No  INTESTINAL SYMPTOMS: No  URINARY SYMPTOMS: No  REPRODUCTIVE SYMPTOMS: No  SKELETAL SYMPTOMS: No  BLOOD SYMPTOMS: No  NERVOUS SYSTEM SYMPTOMS: No  MENTAL HEALTH SYMPTOMS: No

## 2023-02-27 NOTE — NURSING NOTE
Tay Hodges is a 73 year old male patient that presents today in clinic for the following:    Chief Complaint   Patient presents with     Physical     Lingering cough from cold for about month      Medication Refill     The patient's allergies and medications were reviewed as noted. A set of vitals were recorded as noted without incident: /83 (BP Location: Right arm, Patient Position: Sitting, Cuff Size: Adult Regular)   Pulse 62   Wt 85.5 kg (188 lb 9.6 oz)   SpO2 98%   BMI 27.45 kg/m  . The patient does not have any other questions for the provider.    Miesha Delaney, EMT at 9:03 AM on 2/27/2023

## 2023-03-01 DIAGNOSIS — I10 BENIGN ESSENTIAL HYPERTENSION: ICD-10-CM

## 2023-03-01 DIAGNOSIS — R35.1 NOCTURIA: ICD-10-CM

## 2023-03-01 DIAGNOSIS — R35.0 BENIGN PROSTATIC HYPERPLASIA WITH URINARY FREQUENCY: ICD-10-CM

## 2023-03-01 DIAGNOSIS — E78.5 HYPERLIPIDEMIA, UNSPECIFIED HYPERLIPIDEMIA TYPE: ICD-10-CM

## 2023-03-01 DIAGNOSIS — E03.4 HYPOTHYROIDISM DUE TO ACQUIRED ATROPHY OF THYROID: ICD-10-CM

## 2023-03-01 DIAGNOSIS — N40.1 BENIGN PROSTATIC HYPERPLASIA WITH URINARY FREQUENCY: ICD-10-CM

## 2023-03-03 RX ORDER — TAMSULOSIN HYDROCHLORIDE 0.4 MG/1
0.4 CAPSULE ORAL DAILY
Qty: 90 CAPSULE | Refills: 3 | Status: SHIPPED | OUTPATIENT
Start: 2023-03-03 | End: 2024-03-11

## 2023-03-03 RX ORDER — FINASTERIDE 5 MG/1
5 TABLET, FILM COATED ORAL DAILY
Qty: 90 TABLET | Refills: 3 | Status: SHIPPED | OUTPATIENT
Start: 2023-03-03 | End: 2024-03-11

## 2023-03-03 RX ORDER — AMLODIPINE BESYLATE 2.5 MG/1
TABLET ORAL
Qty: 90 TABLET | Refills: 3 | Status: SHIPPED | OUTPATIENT
Start: 2023-03-03 | End: 2024-03-11

## 2023-03-03 RX ORDER — ATORVASTATIN CALCIUM 80 MG/1
80 TABLET, FILM COATED ORAL DAILY
Qty: 90 TABLET | Refills: 3 | Status: SHIPPED | OUTPATIENT
Start: 2023-03-03 | End: 2024-03-11

## 2023-03-03 RX ORDER — LEVOTHYROXINE SODIUM 112 UG/1
112 TABLET ORAL DAILY
Qty: 90 TABLET | Refills: 3 | Status: SHIPPED | OUTPATIENT
Start: 2023-03-03 | End: 2024-03-11

## 2023-03-03 RX ORDER — AMLODIPINE BESYLATE 5 MG/1
5 TABLET ORAL DAILY
Qty: 90 TABLET | Refills: 3 | Status: SHIPPED | OUTPATIENT
Start: 2023-03-03 | End: 2024-03-11

## 2023-03-03 NOTE — TELEPHONE ENCOUNTER
2/27/2023  [facility] Internal Medicine Louann Mancini MD  Internal Medicine      amLODIPine (NORVASC) 5 MG tablet  Last Written Prescription Date:  11-  Last Fill Quantity: 90,   # refills: 0    atorvastatin (LIPITOR) 80 MG tablet  Last Written Prescription Date:  11-  Last Fill Quantity: 90,   # refills:     finasteride (PROSCAR) 5 MG tablet   Last Written Prescription Date:  11-  Last Fill Quantity: 90,   # refills: 0    tamsulosin (FLOMAX) 0.4 MG capsule     Last Written Prescription Date:  11-  Last Fill Quantity: 90,   # refills:     amLODIPine (NORVASC) 2.5 MG tablet  Last Written Prescription Date:  11-  Last Fill Quantity: 90,   # refills:       levothyroxine (SYNTHROID/LEVOTHROID) 112 MCG tablet   Last Written Prescription Date:  11-  Last Fill Quantity: 90,   # refills: 0

## 2023-03-22 ENCOUNTER — MYC MEDICAL ADVICE (OUTPATIENT)
Dept: INTERNAL MEDICINE | Facility: CLINIC | Age: 73
End: 2023-03-22
Payer: MEDICARE

## 2023-03-22 DIAGNOSIS — G93.31 POSTVIRAL SYNDROME: Primary | ICD-10-CM

## 2023-03-27 RX ORDER — MONTELUKAST SODIUM 10 MG/1
10 TABLET ORAL AT BEDTIME
Qty: 90 TABLET | Refills: 0 | Status: SHIPPED | OUTPATIENT
Start: 2023-03-27

## 2023-03-27 RX ORDER — FLUTICASONE PROPIONATE AND SALMETEROL XINAFOATE 115; 21 UG/1; UG/1
2 AEROSOL, METERED RESPIRATORY (INHALATION) 2 TIMES DAILY
Qty: 8 G | Refills: 0 | Status: SHIPPED | OUTPATIENT
Start: 2023-03-27 | End: 2024-03-11

## 2023-04-27 ENCOUNTER — MYC MEDICAL ADVICE (OUTPATIENT)
Dept: INTERNAL MEDICINE | Facility: CLINIC | Age: 73
End: 2023-04-27
Payer: MEDICARE

## 2023-09-21 ENCOUNTER — MYC MEDICAL ADVICE (OUTPATIENT)
Dept: INTERNAL MEDICINE | Facility: CLINIC | Age: 73
End: 2023-09-21
Payer: MEDICARE

## 2024-03-04 SDOH — HEALTH STABILITY: PHYSICAL HEALTH: ON AVERAGE, HOW MANY DAYS PER WEEK DO YOU ENGAGE IN MODERATE TO STRENUOUS EXERCISE (LIKE A BRISK WALK)?: 5 DAYS

## 2024-03-04 SDOH — HEALTH STABILITY: PHYSICAL HEALTH: ON AVERAGE, HOW MANY MINUTES DO YOU ENGAGE IN EXERCISE AT THIS LEVEL?: 50 MIN

## 2024-03-04 ASSESSMENT — SOCIAL DETERMINANTS OF HEALTH (SDOH): HOW OFTEN DO YOU GET TOGETHER WITH FRIENDS OR RELATIVES?: ONCE A WEEK

## 2024-03-11 ENCOUNTER — OFFICE VISIT (OUTPATIENT)
Dept: INTERNAL MEDICINE | Facility: CLINIC | Age: 74
End: 2024-03-11
Payer: MEDICARE

## 2024-03-11 VITALS
SYSTOLIC BLOOD PRESSURE: 137 MMHG | OXYGEN SATURATION: 100 % | BODY MASS INDEX: 27.19 KG/M2 | DIASTOLIC BLOOD PRESSURE: 81 MMHG | HEART RATE: 67 BPM | HEIGHT: 70 IN | WEIGHT: 189.9 LBS

## 2024-03-11 DIAGNOSIS — I10 BENIGN ESSENTIAL HYPERTENSION: ICD-10-CM

## 2024-03-11 DIAGNOSIS — Z00.00 ENCOUNTER FOR MEDICARE ANNUAL WELLNESS EXAM: Primary | ICD-10-CM

## 2024-03-11 DIAGNOSIS — R35.1 NOCTURIA: ICD-10-CM

## 2024-03-11 DIAGNOSIS — I25.10 ATHEROSCLEROSIS OF NATIVE CORONARY ARTERY OF NATIVE HEART WITHOUT ANGINA PECTORIS: ICD-10-CM

## 2024-03-11 DIAGNOSIS — R35.0 BENIGN PROSTATIC HYPERPLASIA WITH URINARY FREQUENCY: ICD-10-CM

## 2024-03-11 DIAGNOSIS — N40.1 BENIGN PROSTATIC HYPERPLASIA WITH URINARY FREQUENCY: ICD-10-CM

## 2024-03-11 DIAGNOSIS — Z29.11 NEED FOR VACCINATION AGAINST RESPIRATORY SYNCYTIAL VIRUS: ICD-10-CM

## 2024-03-11 DIAGNOSIS — E03.4 HYPOTHYROIDISM DUE TO ACQUIRED ATROPHY OF THYROID: ICD-10-CM

## 2024-03-11 DIAGNOSIS — E78.5 HYPERLIPIDEMIA, UNSPECIFIED HYPERLIPIDEMIA TYPE: ICD-10-CM

## 2024-03-11 PROCEDURE — G0439 PPPS, SUBSEQ VISIT: HCPCS | Performed by: INTERNAL MEDICINE

## 2024-03-11 RX ORDER — RESPIRATORY SYNCYTIAL VIRUS VACCINE 120MCG/0.5
0.5 KIT INTRAMUSCULAR ONCE
Qty: 1 EACH | Refills: 0 | Status: CANCELLED | OUTPATIENT
Start: 2024-03-11 | End: 2024-03-11

## 2024-03-11 RX ORDER — ATORVASTATIN CALCIUM 80 MG/1
80 TABLET, FILM COATED ORAL DAILY
Qty: 90 TABLET | Refills: 3 | Status: SHIPPED | OUTPATIENT
Start: 2024-03-11

## 2024-03-11 RX ORDER — TAMSULOSIN HYDROCHLORIDE 0.4 MG/1
0.4 CAPSULE ORAL DAILY
Qty: 90 CAPSULE | Refills: 3 | Status: SHIPPED | OUTPATIENT
Start: 2024-03-11

## 2024-03-11 RX ORDER — LEVOTHYROXINE SODIUM 112 UG/1
112 TABLET ORAL DAILY
Qty: 90 TABLET | Refills: 3 | Status: SHIPPED | OUTPATIENT
Start: 2024-03-11

## 2024-03-11 RX ORDER — FINASTERIDE 5 MG/1
5 TABLET, FILM COATED ORAL DAILY
Qty: 90 TABLET | Refills: 3 | Status: SHIPPED | OUTPATIENT
Start: 2024-03-11

## 2024-03-11 RX ORDER — AMLODIPINE BESYLATE 10 MG/1
10 TABLET ORAL DAILY
Qty: 90 TABLET | Refills: 3 | Status: SHIPPED | OUTPATIENT
Start: 2024-03-11

## 2024-03-11 ASSESSMENT — PAIN SCALES - GENERAL: PAINLEVEL: NO PAIN (0)

## 2024-03-11 NOTE — PROGRESS NOTES
"Preventive Care Visit  [facility]  Louann Villanueva MD, Internal Medicine  Mar 11, 2024    Assessment & Plan     Encounter for Medicare annual wellness exam  Routine health care reviewed and updated as appropriate. Patient declines colonoscopy.    Atherosclerosis of native coronary artery of native heart without angina pectoris  - Lipid panel reflex to direct LDL Fasting; Future    Hypothyroidism due to acquired atrophy of thyroid  - TSH WITH FREE T4 REFLEX; Future  - levothyroxine (SYNTHROID/LEVOTHROID) 112 MCG tablet; Take 1 tablet (112 mcg) by mouth daily    Hyperlipidemia, unspecified hyperlipidemia type  - Comprehensive metabolic panel (BMP + Alb, Alk Phos, ALT, AST, Total. Bili, TP); Future  - atorvastatin (LIPITOR) 80 MG tablet; Take 1 tablet (80 mg) by mouth daily    Benign essential hypertension  Will increase dose given systolic hypertension  - amLODIPine (NORVASC) 10 MG tablet; Take 1 tablet (10 mg) by mouth daily    Benign prostatic hyperplasia with urinary frequency  - finasteride (PROSCAR) 5 MG tablet; Take 1 tablet (5 mg) by mouth daily    Nocturia  - tamsulosin (FLOMAX) 0.4 MG capsule; Take 1 capsule (0.4 mg) by mouth daily              BMI  Estimated body mass index is 27.64 kg/m  as calculated from the following:    Height as of this encounter: 1.765 m (5' 9.5\").    Weight as of this encounter: 86.1 kg (189 lb 14.4 oz).       Counseling  Appropriate preventive services were discussed with this patient, including applicable screening as appropriate for fall prevention, nutrition, physical activity, Tobacco-use cessation, weight loss and cognition.  Checklist reviewing preventive services available has been given to the patient.  Reviewed patient's diet, addressing concerns and/or questions.   Patient reported safety concerns were addressed today.        No follow-ups on file.    Shahab Cross is a 74 year old, presenting for the following:  Physical " (Pt here for physical /Concerns:/Scratchy throat, dry cough. )        3/11/2024     8:00 AM   Additional Questions   Roomed by MVO, EMt         Health Care Directive  Patient does not have a Health Care Directive or Living Will: Patient states has Advance Directive and will bring in a copy to clinic.    HPI    He has been traveling to [place] and [place] to visit family. Wife had cold and he recently developed a dry cough. No dyspnea, no sore throat currently, no post nasal gtt.  Not taking anything.  Not keeping him up at night.    He states he is enjoying [facility], happy and healthy.  Son/daughter are each expecting 2nd children this summer respectively.  He continues to take prostate meds, gets up 3-4 times but not bothered by this.  BP is typically in the 130s systolic at home.  We discussed colonoscopy screening; he doesn't want to do any more of these despite discussions re: adenoma possibly developing into malignancy.       Routine Health Maintenance:    Lipids:   Recent Labs   Lab Test 02/27/23  0953 01/24/22  1548   CHOL 162 153   HDL 65 60   LDL 86 82   TRIG 54 57        PSA (50-75 yrs):   PSA   Date Value Ref Range Status   02/13/2020 3.72 0 - 4 ug/L Final     Comment:     Assay Method:  Chemiluminescence using Siemens Vista analyzer     Prostate Specific Antigen Screen   Date Value Ref Range Status   01/24/2022 2.62 0.00 - 4.00 ug/L Final     AAA Screening (65-75 yrs): n/a  Lung Ca Screening (>30 pk age 55-79 or >20 py age 50-79 + RF): n/a  Colonoscopy (50-75 yrs): 3/18 several large TAs, rec 3 years follow up, due (discussed in detail and declined by patient; also declined stool based testing)  Dexa (>65W or 70M yrs): n/a  HIV/HCV if risk factors: HCV neg 2017  Safety/Lifestyle: reviewed  Tob/EtOH: reviewed  Depression: screen neg  PHQ-2 Score:         3/11/2024     7:36 AM 1/24/2022     2:32 PM   PHQ-2 ( 1999 Pfizer)   Q1: Little interest or pleasure in doing things 0 0   Q2: Feeling down, depressed or  hopeless 0 0   PHQ-2 Score 0 0   Q1: Little interest or pleasure in doing things Not at all    Q2: Feeling down, depressed or hopeless Not at all    PHQ-2 Score 0      Advanced Directive:  deferred  Panc MRI: done 2022, stable, consider 2-3 years follow up                3/4/2024   General Health   How would you rate your overall physical health? Good   Feel stress (tense, anxious, or unable to sleep) To some extent   (!) STRESS CONCERN      3/4/2024   Nutrition   Diet: Regular (no restrictions)         3/4/2024   Exercise   Days per week of moderate/strenous exercise 5 days   Average minutes spent exercising at this level 50 min         3/4/2024   Social Factors   Frequency of gathering with friends or relatives Once a week   Worry food won't last until get money to buy more No   Food not last or not have enough money for food? No   Do you have housing?  Yes   Are you worried about losing your housing? No   Lack of transportation? No   Unable to get utilities (heat,electricity)? No         3/4/2024   Fall Risk   Fallen 2 or more times in the past year? No    No   Trouble with walking or balance? No    No          3/4/2024   Activities of Daily Living- Home Safety   Needs help with the following daily activites None of the above   Safety concerns in the home No grab bars in the bathroom         3/4/2024   Dental   Dentist two times every year? Yes         3/4/2024   Hearing Screening   Hearing concerns? None of the above         3/4/2024   Driving Risk Screening   Patient/family members have concerns about driving No         3/4/2024   General Alertness/Fatigue Screening   Have you been more tired than usual lately? No         3/4/2024   Urinary Incontinence Screening   Bothered by leaking urine in past 6 months No         3/4/2024   TB Screening   Were you born outside of [place]?  No         Today's PHQ-2 Score:       3/11/2024     7:36 AM   PHQ-2 ( 1999 Pfizer)   Q1: Little interest or pleasure in doing things 0   Q2:  Feeling down, depressed or hopeless 0   PHQ-2 Score 0   Q1: Little interest or pleasure in doing things Not at all   Q2: Feeling down, depressed or hopeless Not at all   PHQ-2 Score 0           3/4/2024   Substance Use   Alcohol more than 3/day or more than 7/wk No   Do you have a current opioid prescription? No   How severe/bad is pain from 1 to 10? 0/10 (No Pain)   Do you use any other substances recreationally? No     Social History     Tobacco Use    Smoking status: Former     Packs/day: 0.00     Years: 5.00     Additional pack years: 0.00     Total pack years: 0.00     Types: Cigarettes, Pipe     Quit date: 1990     Years since quittin.2    Smokeless tobacco: Never   Substance Use Topics    Alcohol use: Yes     Comment: 10    Drug use: Never           3/4/2024   AAA Screening   Family history of Abdominal Aortic Aneurysm (AAA)? No   ASCVD Risk   The 10-year ASCVD risk score (Zuhair SIDHU, et al., 2019) is: 22%    Values used to calculate the score:      Age: 74 years      Sex: Male      Is Non- : No      Diabetic: No      Tobacco smoker: No      Systolic Blood Pressure: 137 mmHg      Is BP treated: Yes      HDL Cholesterol: 65 mg/dL      Total Cholesterol: 162 mg/dL            Reviewed and updated as needed this visit by Provider                      Current providers sharing in care for this patient include:  Patient Care Team:  Louann Villanueva MD as PCP - General (Internal Medicine)  Louann Villanueva MD as Assigned PCP    The following health maintenance items are reviewed in Epic and correct as of today:  Health Maintenance   Topic Date Due    ANNUAL REVIEW OF HM ORDERS  Never done    ADVANCE CARE PLANNING  Never done    RSV VACCINE (Pregnancy & 60+) (1 - 1-dose 60+ series) Never done    LIPID  2024    MEDICARE ANNUAL WELLNESS VISIT  2024    TSH W/FREE T4 REFLEX  2024    FALL RISK ASSESSMENT  2025    GLUCOSE  2026    COLORECTAL CANCER  "SCREENING  03/19/2028    DTAP/TDAP/TD IMMUNIZATION (3 - Td or Tdap) 05/17/2033    HEPATITIS C SCREENING  Completed    PHQ-2 (once per calendar year)  Completed    INFLUENZA VACCINE  Completed    Pneumococcal Vaccine: 65+ Years  Completed    ZOSTER IMMUNIZATION  Completed    AORTIC ANEURYSM SCREENING (SYSTEM ASSIGNED)  Completed    COVID-19 Vaccine  Completed    IPV IMMUNIZATION  Aged Out    HPV IMMUNIZATION  Aged Out    MENINGITIS IMMUNIZATION  Aged Out    RSV MONOCLONAL ANTIBODY  Aged Out            Objective    Exam  /81 (BP Location: Right arm, Patient Position: Sitting, Cuff Size: Adult Regular)   Pulse 67   Ht 1.765 m (5' 9.5\")   Wt 86.1 kg (189 lb 14.4 oz)   SpO2 100%   BMI 27.64 kg/m     Estimated body mass index is 27.64 kg/m  as calculated from the following:    Height as of this encounter: 1.765 m (5' 9.5\").    Weight as of this encounter: 86.1 kg (189 lb 14.4 oz).    Physical Exam  Constitutional: Alert, oriented, pleasant, no acute distress  Head: Normocephalic, atraumatic  Eyes: Extra-ocular movements intact, pupils equally round and reactive bilaterally, no scleral icterus  ENT: Oropharynx clear, moist mucus membranes, good dentition, mild erythema of posterior oropharynx, no exudate  Neck: Supple, no lymphadenopathy, no thyromegaly  Cardiovascular: Regular rate and rhythm, no murmurs, rubs or gallops, peripheral pulses full/symmetric  Respiratory: Good air movement bilaterally, lungs clear, no wheezes/rales/rhonchi  GI: Abdomen soft, bowel sounds present, nondistended, nontender, no organomegaly or masses, no rebound/guarding  Musculoskeletal: No edema, normal muscle tone, normal gait  Neurologic: Alert and oriented, cranial nerves 2-12 intact, strength 5/5 throughout  Skin: No rashes/lesions  Psychiatric: normal mentation, affect and mood          3/11/2024   Mini Cog   Clock Draw Score 2 Normal   3 Item Recall 3 objects recalled   Mini Cog Total Score 5            Signed Electronically " by: Louann Villanueva MD

## 2024-03-12 ENCOUNTER — LAB (OUTPATIENT)
Dept: LAB | Facility: CLINIC | Age: 74
End: 2024-03-12
Payer: MEDICARE

## 2024-03-12 DIAGNOSIS — I25.10 ATHEROSCLEROSIS OF NATIVE CORONARY ARTERY OF NATIVE HEART WITHOUT ANGINA PECTORIS: ICD-10-CM

## 2024-03-12 DIAGNOSIS — E78.5 HYPERLIPIDEMIA, UNSPECIFIED HYPERLIPIDEMIA TYPE: ICD-10-CM

## 2024-03-12 DIAGNOSIS — E03.4 HYPOTHYROIDISM DUE TO ACQUIRED ATROPHY OF THYROID: ICD-10-CM

## 2024-03-12 LAB
ALBUMIN SERPL BCG-MCNC: 4.1 G/DL (ref 3.5–5.2)
ALP SERPL-CCNC: 49 U/L (ref 40–150)
ALT SERPL W P-5'-P-CCNC: 18 U/L (ref 0–70)
ANION GAP SERPL CALCULATED.3IONS-SCNC: 8 MMOL/L (ref 7–15)
AST SERPL W P-5'-P-CCNC: 22 U/L (ref 0–45)
BILIRUB SERPL-MCNC: 0.5 MG/DL
BUN SERPL-MCNC: 24.7 MG/DL (ref 8–23)
CALCIUM SERPL-MCNC: 9.3 MG/DL (ref 8.8–10.2)
CHLORIDE SERPL-SCNC: 105 MMOL/L (ref 98–107)
CHOLEST SERPL-MCNC: 133 MG/DL
CREAT SERPL-MCNC: 0.93 MG/DL (ref 0.67–1.17)
DEPRECATED HCO3 PLAS-SCNC: 27 MMOL/L (ref 22–29)
EGFRCR SERPLBLD CKD-EPI 2021: 87 ML/MIN/1.73M2
FASTING STATUS PATIENT QL REPORTED: YES
GLUCOSE SERPL-MCNC: 109 MG/DL (ref 70–99)
HDLC SERPL-MCNC: 59 MG/DL
LDLC SERPL CALC-MCNC: 67 MG/DL
NONHDLC SERPL-MCNC: 74 MG/DL
POTASSIUM SERPL-SCNC: 4 MMOL/L (ref 3.4–5.3)
PROT SERPL-MCNC: 6.9 G/DL (ref 6.4–8.3)
SODIUM SERPL-SCNC: 140 MMOL/L (ref 135–145)
TRIGL SERPL-MCNC: 37 MG/DL
TSH SERPL DL<=0.005 MIU/L-ACNC: 0.66 UIU/ML (ref 0.3–4.2)

## 2024-03-12 PROCEDURE — 36415 COLL VENOUS BLD VENIPUNCTURE: CPT | Performed by: PATHOLOGY

## 2024-03-12 PROCEDURE — 84443 ASSAY THYROID STIM HORMONE: CPT | Performed by: PATHOLOGY

## 2024-03-12 PROCEDURE — 80061 LIPID PANEL: CPT | Performed by: PATHOLOGY

## 2024-03-12 PROCEDURE — 80053 COMPREHEN METABOLIC PANEL: CPT | Performed by: PATHOLOGY

## 2024-04-11 ENCOUNTER — OFFICE VISIT (OUTPATIENT)
Dept: INTERNAL MEDICINE | Facility: CLINIC | Age: 74
End: 2024-04-11
Payer: MEDICARE

## 2024-04-11 VITALS
OXYGEN SATURATION: 95 % | HEART RATE: 70 BPM | DIASTOLIC BLOOD PRESSURE: 72 MMHG | SYSTOLIC BLOOD PRESSURE: 122 MMHG | BODY MASS INDEX: 27.64 KG/M2 | HEIGHT: 70 IN

## 2024-04-11 DIAGNOSIS — H60.502 ACUTE OTITIS EXTERNA OF LEFT EAR, UNSPECIFIED TYPE: ICD-10-CM

## 2024-04-11 DIAGNOSIS — H61.23 BILATERAL IMPACTED CERUMEN: Primary | ICD-10-CM

## 2024-04-11 DIAGNOSIS — H61.23 IMPACTED CERUMEN OF BOTH EARS: ICD-10-CM

## 2024-04-11 PROCEDURE — 99214 OFFICE O/P EST MOD 30 MIN: CPT | Mod: GC

## 2024-04-11 RX ORDER — OFLOXACIN 3 MG/ML
5 SOLUTION AURICULAR (OTIC) 2 TIMES DAILY
Qty: 10 ML | Refills: 1 | Status: SHIPPED | OUTPATIENT
Start: 2024-04-11 | End: 2024-04-18

## 2024-04-11 ASSESSMENT — PAIN SCALES - GENERAL: PAINLEVEL: MODERATE PAIN (5)

## 2024-04-11 NOTE — PROGRESS NOTES
"Assessment & Plan     Acute otitis externa of left ear, unspecified type  Bilateral impacted cerumen  The patient presents with a sensation of pressure, mild pain, tenderness upon moving the auricle, and hearing loss that began a few days ago. Initially, the pressure was felt on the left side, but now affects the right side as well. He has had no systemic symptoms such as fever, chills, or fatigue, and reports no tinnitus or balance issues. His medical history is notable for a recent upper respiratory infection / rhinosinusitis, which resolved with montelukast and did not require antibiotics. He has not swum and used cotton swabs in the ears. The patient recalls experiencing similar symptoms 10-12 years ago and was diagnosed with impacted cerumen at that time. On examination, impacted cerumen is visualized in the external ear canal on the right side whereas on the left side the canal appearing erythematous and completely blocked by dark brown cerumen.  - ofloxacin (FLOXIN) 0.3 % otic solution; Place 5 drops Into the left ear 2 times daily for 7 days  - MT REMOVAL IMPACTED CERUMEN IRRIGATION/LVG UNILAT (RN/MA); Standing      BMI  Estimated body mass index is 27.64 kg/m  as calculated from the following:    Height as of this encounter: 1.765 m (5' 9.5\").    Weight as of 3/11/24: 86.1 kg (189 lb 14.4 oz).   The medical literature suggests that a BMI in the overweight range (25-30 kg/m^2) may not be associated with increased health risks in older adults and could be associated with a lower overall mortality rate compared to those with a normal BMI.       No follow-ups on file.    Shahab Cross is a 74 year old, presenting for the following health issues:  Ear Problem (Pt here for ear pain )      4/11/2024    12:44 PM   Additional Questions   Roomed by MVO, EMT     Ear Problem    History of Present Illness       Reason for visit:  Pain in left ear. Either clogged and/or an infection.  Symptom onset:  3-7 days " "ago  Symptoms include:  Loss of hearing; pain; clogged feeling  Symptom intensity:  Moderate  Symptom progression:  Staying the same  Had these symptoms before:  Yes    He eats 2-3 servings of fruits and vegetables daily.He consumes 0 sweetened beverage(s) daily.He exercises with enough effort to increase his heart rate 10 to 19 minutes per day.  He exercises with enough effort to increase his heart rate 4 days per week.   He is taking medications regularly.         Objective    /72 (BP Location: Right arm, Patient Position: Sitting, Cuff Size: Adult Regular)   Pulse 70   Ht 1.765 m (5' 9.5\")   SpO2 95%   BMI 27.64 kg/m    Body mass index is 27.64 kg/m .  Physical Exam   General appearance: In no apparent distress  HEENT: Impacted cerumen is visualized in the external ear canal on the right side whereas on the left side the canal appearing erythematous and completely blocked by dark brown cerumen  Resp: Normal respiratory effort, clear to ausculation bilaterally without any added sounds  Skin: Normal skin around the ears and auricle  Neuro: Alert and oriented x4, appeared non-focal    I saw the patient and discussed the plan with the attending physician, Dr. Lucas.    Percy Mallory  Resident Physician PGY-1  Department of Medicine  [facility]    Signed Electronically by: Percy Mallory MD  "

## 2025-03-12 SDOH — HEALTH STABILITY: PHYSICAL HEALTH: ON AVERAGE, HOW MANY MINUTES DO YOU ENGAGE IN EXERCISE AT THIS LEVEL?: 50 MIN

## 2025-03-12 SDOH — HEALTH STABILITY: PHYSICAL HEALTH: ON AVERAGE, HOW MANY DAYS PER WEEK DO YOU ENGAGE IN MODERATE TO STRENUOUS EXERCISE (LIKE A BRISK WALK)?: 3 DAYS

## 2025-03-12 ASSESSMENT — SOCIAL DETERMINANTS OF HEALTH (SDOH): HOW OFTEN DO YOU GET TOGETHER WITH FRIENDS OR RELATIVES?: TWICE A WEEK

## 2025-03-17 ENCOUNTER — LAB (OUTPATIENT)
Dept: LAB | Facility: CLINIC | Age: 75
End: 2025-03-17
Payer: COMMERCIAL

## 2025-03-17 ENCOUNTER — OFFICE VISIT (OUTPATIENT)
Dept: INTERNAL MEDICINE | Facility: CLINIC | Age: 75
End: 2025-03-17
Payer: COMMERCIAL

## 2025-03-17 VITALS
BODY MASS INDEX: 27.85 KG/M2 | SYSTOLIC BLOOD PRESSURE: 131 MMHG | HEIGHT: 69 IN | DIASTOLIC BLOOD PRESSURE: 78 MMHG | RESPIRATION RATE: 14 BRPM | OXYGEN SATURATION: 99 % | HEART RATE: 63 BPM | WEIGHT: 188 LBS | TEMPERATURE: 97.8 F

## 2025-03-17 DIAGNOSIS — Z13.1 SCREENING FOR DIABETES MELLITUS: ICD-10-CM

## 2025-03-17 DIAGNOSIS — I25.10 ATHEROSCLEROSIS OF NATIVE CORONARY ARTERY OF NATIVE HEART WITHOUT ANGINA PECTORIS: ICD-10-CM

## 2025-03-17 DIAGNOSIS — E03.4 HYPOTHYROIDISM DUE TO ACQUIRED ATROPHY OF THYROID: ICD-10-CM

## 2025-03-17 DIAGNOSIS — Z00.00 ENCOUNTER FOR MEDICARE ANNUAL WELLNESS EXAM: Primary | ICD-10-CM

## 2025-03-17 DIAGNOSIS — E78.49 OTHER HYPERLIPIDEMIA: ICD-10-CM

## 2025-03-17 DIAGNOSIS — Z78.9 IMMUNE TO MEASLES: ICD-10-CM

## 2025-03-17 DIAGNOSIS — Z12.5 SPECIAL SCREENING FOR MALIGNANT NEOPLASM OF PROSTATE: ICD-10-CM

## 2025-03-17 DIAGNOSIS — I25.10 CORONARY ATHEROSCLEROSIS OF NATIVE CORONARY ARTERY: ICD-10-CM

## 2025-03-17 LAB
ALBUMIN SERPL BCG-MCNC: 4.4 G/DL (ref 3.5–5.2)
ALP SERPL-CCNC: 51 U/L (ref 40–150)
ALT SERPL W P-5'-P-CCNC: 24 U/L (ref 0–70)
ANION GAP SERPL CALCULATED.3IONS-SCNC: 9 MMOL/L (ref 7–15)
AST SERPL W P-5'-P-CCNC: 25 U/L (ref 0–45)
BILIRUB SERPL-MCNC: 0.5 MG/DL
BUN SERPL-MCNC: 21.7 MG/DL (ref 8–23)
CALCIUM SERPL-MCNC: 9.7 MG/DL (ref 8.8–10.4)
CHLORIDE SERPL-SCNC: 105 MMOL/L (ref 98–107)
CHOLEST SERPL-MCNC: 142 MG/DL
CREAT SERPL-MCNC: 0.9 MG/DL (ref 0.67–1.17)
EGFRCR SERPLBLD CKD-EPI 2021: 90 ML/MIN/1.73M2
EST. AVERAGE GLUCOSE BLD GHB EST-MCNC: 126 MG/DL
FASTING STATUS PATIENT QL REPORTED: YES
FASTING STATUS PATIENT QL REPORTED: YES
GLUCOSE SERPL-MCNC: 108 MG/DL (ref 70–99)
HBA1C MFR BLD: 6 %
HCO3 SERPL-SCNC: 26 MMOL/L (ref 22–29)
HDLC SERPL-MCNC: 61 MG/DL
LDLC SERPL CALC-MCNC: 72 MG/DL
MEV IGG SER IA-ACNC: 68.8 AU/ML
MEV IGG SER IA-ACNC: POSITIVE
NONHDLC SERPL-MCNC: 81 MG/DL
POTASSIUM SERPL-SCNC: 4.9 MMOL/L (ref 3.4–5.3)
PROT SERPL-MCNC: 7.4 G/DL (ref 6.4–8.3)
PSA SERPL DL<=0.01 NG/ML-MCNC: 2.51 NG/ML (ref 0–6.5)
SODIUM SERPL-SCNC: 140 MMOL/L (ref 135–145)
TRIGL SERPL-MCNC: 46 MG/DL
TSH SERPL DL<=0.005 MIU/L-ACNC: 0.71 UIU/ML (ref 0.3–4.2)

## 2025-03-17 PROCEDURE — G0103 PSA SCREENING: HCPCS | Performed by: PATHOLOGY

## 2025-03-17 PROCEDURE — 86765 RUBEOLA ANTIBODY: CPT | Performed by: INTERNAL MEDICINE

## 2025-03-17 PROCEDURE — 84443 ASSAY THYROID STIM HORMONE: CPT | Performed by: PATHOLOGY

## 2025-03-17 PROCEDURE — 80061 LIPID PANEL: CPT | Performed by: PATHOLOGY

## 2025-03-17 PROCEDURE — 99000 SPECIMEN HANDLING OFFICE-LAB: CPT | Performed by: PATHOLOGY

## 2025-03-17 PROCEDURE — 83036 HEMOGLOBIN GLYCOSYLATED A1C: CPT | Performed by: INTERNAL MEDICINE

## 2025-03-17 PROCEDURE — 3078F DIAST BP <80 MM HG: CPT | Performed by: INTERNAL MEDICINE

## 2025-03-17 PROCEDURE — 36415 COLL VENOUS BLD VENIPUNCTURE: CPT | Performed by: PATHOLOGY

## 2025-03-17 PROCEDURE — 80053 COMPREHEN METABOLIC PANEL: CPT | Performed by: PATHOLOGY

## 2025-03-17 PROCEDURE — G0439 PPPS, SUBSEQ VISIT: HCPCS | Performed by: INTERNAL MEDICINE

## 2025-03-17 PROCEDURE — 3075F SYST BP GE 130 - 139MM HG: CPT | Performed by: INTERNAL MEDICINE

## 2025-03-17 NOTE — PROGRESS NOTES
"Preventive Care Visit  [facility]  Louann Villanueva MD, Internal Medicine  Mar 17, 2025      Assessment & Plan     Coronary atherosclerosis of native coronary artery  - Lipid panel reflex to direct LDL Non-fasting; Future    Hypothyroidism due to acquired atrophy of thyroid  - TSH WITH FREE T4 REFLEX; Future    Other hyperlipidemia  - Comprehensive metabolic panel (BMP + Alb, Alk Phos, ALT, AST, Total. Bili, TP); Future    Screening for diabetes mellitus  - Hemoglobin A1c; Future    Special screening for malignant neoplasm of prostate  - PSA, screen; Future    Screen for immunity to measles  - Rubeola Antibody IgG; Future    Encounter for Medicare annual wellness exam  - REVIEW OF HEALTH MAINTENANCE PROTOCOL ORDERS            BMI  Estimated body mass index is 27.37 kg/m  as calculated from the following:    Height as of this encounter: 1.765 m (5' 9.49\").    Weight as of this encounter: 85.3 kg (188 lb).       Counseling  Appropriate preventive services were addressed with this patient via screening, questionnaire, or discussion as appropriate for fall prevention, nutrition, physical activity, Tobacco-use cessation, social engagement, weight loss and cognition.  Checklist reviewing preventive services available has been given to the patient.  Reviewed patient's diet, addressing concerns and/or questions.   He is at risk for lack of exercise and has been provided with information to increase physical activity for the benefit of his well-being.   Patient reported safety concerns were addressed today.Information on urinary incontinence and treatment options given to patient.           No follow-ups on file.    Shahab Cross is a 75 year old, presenting for the following:  Physical and Refill Request (/)        3/17/2025     8:35 AM   Additional Questions   Roomed by Page SHAH   Accompanied by N/A         HPI    He has been traveling to [place] and [place] to visit family. "     Entire family in [place] for xmas, 4 grandkids together, ages infant to 4 yo  Unfortunately sick after holidays, he was diagnosed with bronchitis after CXR, given levofloxacin, budesonide, atrovent inhalers, he is feeling improved, this was 5 weeks ago, last 2 weeks feeling better    Walking 6k steps in [place], 3k in [place]  ROS:  no cardiopulmonary symptoms, no significant aches/pain, no mental health concerns, urination manageable    We discussed colonoscopy screening; he doesn't want to do any more of these despite discussions re: adenoma possibly developing into malignancy.  We reviewed pancreatic cyst surveillance, given stability, prefers to defer to next 1-2 years       Routine Health Maintenance:    Lipids:   Recent Labs   Lab Test 03/12/24  0721 02/27/23  0953   CHOL 133 162   HDL 59 65   LDL 67 86   TRIG 37 54     The 10-year ASCVD risk score (uZhair SIDHU, et al., 2019) is: 21.2%    Values used to calculate the score:      Age: 75 years      Sex: Male      Is Non- : No      Diabetic: No      Tobacco smoker: No      Systolic Blood Pressure: 131 mmHg      Is BP treated: Yes      HDL Cholesterol: 59 mg/dL      Total Cholesterol: 133 mg/dL       PSA (50-75 yrs):   PSA   Date Value Ref Range Status   02/13/2020 3.72 0 - 4 ug/L Final     Comment:     Assay Method:  Chemiluminescence using Siemens Vista analyzer     Prostate Specific Antigen Screen   Date Value Ref Range Status   01/24/2022 2.62 0.00 - 4.00 ug/L Final     AAA Screening (65-75 yrs): n/a  Lung Ca Screening (>30 pk age 55-79 or >20 py age 50-79 + RF): n/a  Colonoscopy (50-75 yrs): 3/18 several large TAs, rec 3 years follow up, due (discussed in detail and declined by patient; also declined stool based testing)  Dexa (>65W or 70M yrs): n/a  HIV/HCV if risk factors: HCV neg 2017  PHQ-2 Score: PHQ-2 Score:         3/17/2025     8:29 AM 3/11/2024     7:36 AM   PHQ-2 ( 1999 Pfizer)   Q1: Little interest or pleasure in doing things 0 0    Q2: Feeling down, depressed or hopeless 0 0   PHQ-2 Score 0  0   Q1: Little interest or pleasure in doing things Not at all Not at all   Q2: Feeling down, depressed or hopeless Not at all Not at all   PHQ-2 Score 0 0       Patient-reported     Panc MRI: done 2022, stable, consider 2-3 years follow up            Advance Care Planning  Patient does not have a Health Care Directive: Patient states has Advance Directive and will bring in a copy to clinic.      3/12/2025   General Health   How would you rate your overall physical health? Good   Feel stress (tense, anxious, or unable to sleep) Not at all         3/12/2025   Nutrition   Diet: Regular (no restrictions)         3/12/2025   Exercise   Days per week of moderate/strenous exercise 3 days   Average minutes spent exercising at this level 50 min         3/12/2025   Social Factors   Frequency of gathering with friends or relatives Twice a week   Worry food won't last until get money to buy more No   Food not last or not have enough money for food? No   Do you have housing? (Housing is defined as stable permanent housing and does not include staying ouside in a car, in a tent, in an abandoned building, in an overnight shelter, or couch-surfing.) Yes   Are you worried about losing your housing? No   Lack of transportation? No   Unable to get utilities (heat,electricity)? No         3/12/2025   Fall Risk   Fallen 2 or more times in the past year? No    No   Trouble with walking or balance? No    No       Multiple values from one day are sorted in reverse-chronological order          3/12/2025   Activities of Daily Living- Home Safety   Needs help with the following daily activites None of the above   Safety concerns in the home No grab bars in the bathroom         3/12/2025   Dental   Dentist two times every year? Yes         3/12/2025   Hearing Screening   Hearing concerns? None of the above         3/12/2025   Driving Risk Screening   Patient/family members have  concerns about driving No         3/12/2025   General Alertness/Fatigue Screening   Have you been more tired than usual lately? No         3/12/2025   Urinary Incontinence Screening   Bothered by leaking urine in past 6 months Yes           3/4/2024   TB Screening   Were you born outside of the [place]? No           Today's PHQ-2 Score:       3/17/2025     8:29 AM   PHQ-2 (  Pf[employer])   Q1: Little interest or pleasure in doing things 0   Q2: Feeling down, depressed or hopeless 0   PHQ-2 Score 0    Q1: Little interest or pleasure in doing things Not at all   Q2: Feeling down, depressed or hopeless Not at all   PHQ-2 Score 0       Patient-reported           3/12/2025   Substance Use   Alcohol more than 3/day or more than 7/wk No   Do you have a current opioid prescription? No   How severe/bad is pain from 1 to 10? 0/10 (No Pain)   Do you use any other substances recreationally? No     Social History     Tobacco Use    Smoking status: Former     Current packs/day: 0.00     Types: Cigarettes, Pipe     Quit date: 1985     Years since quittin.2    Smokeless tobacco: Never   Substance Use Topics    Alcohol use: Yes     Comment: 10    Drug use: Never       ASCVD Risk   The 10-year ASCVD risk score (Zuhair DK, et al., 2019) is: 21.2%    Values used to calculate the score:      Age: 75 years      Sex: Male      Is Non- : No      Diabetic: No      Tobacco smoker: No      Systolic Blood Pressure: 131 mmHg      Is BP treated: Yes      HDL Cholesterol: 59 mg/dL      Total Cholesterol: 133 mg/dL            Reviewed and updated as needed this visit by Provider                      Current providers sharing in care for this patient include:  Patient Care Team:  Louann Villanueva MD as PCP - General (Internal Medicine)  Louann Villanueva MD as Assigned PCP    The following health maintenance items are reviewed in Epic and correct as of today:  Health Maintenance   Topic Date Due    ANNUAL REVIEW OF   "ORDERS  03/11/2025    BMP  03/12/2025    LIPID  03/12/2025    MEDICARE ANNUAL WELLNESS VISIT  03/11/2025    TSH W/FREE T4 REFLEX  03/12/2025    COVID-19 Vaccine (9 - 2024-25 season) 04/17/2025    FALL RISK ASSESSMENT  03/17/2026    GLUCOSE  03/12/2027    COLORECTAL CANCER SCREENING  03/19/2028    ADVANCE CARE PLANNING  03/11/2029    DTAP/TDAP/TD IMMUNIZATION (3 - Td or Tdap) 05/17/2033    HEPATITIS C SCREENING  Completed    PHQ-2 (once per calendar year)  Completed    INFLUENZA VACCINE  Completed    Pneumococcal Vaccine: 50+ Years  Completed    ZOSTER IMMUNIZATION  Completed    RSV VACCINE  Completed    AORTIC ANEURYSM SCREENING (SYSTEM ASSIGNED)  Completed    HPV IMMUNIZATION  Aged Out    MENINGITIS IMMUNIZATION  Aged Out            Objective    Exam  /78 (BP Location: Right arm, Patient Position: Sitting, Cuff Size: Adult Regular)   Pulse 63   Temp 97.8  F (36.6  C)   Resp 14   Ht 1.765 m (5' 9.49\")   Wt 85.3 kg (188 lb)   SpO2 99%   BMI 27.37 kg/m     Estimated body mass index is 27.37 kg/m  as calculated from the following:    Height as of this encounter: 1.765 m (5' 9.49\").    Weight as of this encounter: 85.3 kg (188 lb).    Physical Exam  Constitutional: Alert, oriented, pleasant, no acute distress  Head: Normocephalic, atraumatic  Eyes: Extra-ocular movements intact, no scleral icterus  ENT: Oropharynx clear, moist mucus membranes  Neck: Supple, no lymphadenopathy  Cardiovascular: Regular rate and rhythm, no murmurs, rubs or gallops, peripheral pulses full/symmetric  Respiratory: Good air movement bilaterally, lungs clear, no wheezes/rales/rhonchi  GI: Abdomen soft, bowel sounds present, nondistended, nontender, no organomegaly or masses, no rebound/guarding  Musculoskeletal: No edema, normal muscle tone, normal gait  Neurologic: Alert and oriented, cranial nerves 2-12 intact, no focal deficits  Skin: No rashes/lesions  Psychiatric: normal mentation, affect and mood          3/17/2025   Mini Cog "   Clock Draw Score 2 Normal   3 Item Recall 3 objects recalled   Mini Cog Total Score 5              Signed Electronically by: Louann Villanueva MD

## 2025-03-18 ENCOUNTER — MYC REFILL (OUTPATIENT)
Dept: INTERNAL MEDICINE | Facility: CLINIC | Age: 75
End: 2025-03-18
Payer: MEDICARE

## 2025-03-18 DIAGNOSIS — N40.1 BENIGN PROSTATIC HYPERPLASIA WITH URINARY FREQUENCY: ICD-10-CM

## 2025-03-18 DIAGNOSIS — E78.5 HYPERLIPIDEMIA, UNSPECIFIED HYPERLIPIDEMIA TYPE: ICD-10-CM

## 2025-03-18 DIAGNOSIS — I10 BENIGN ESSENTIAL HYPERTENSION: ICD-10-CM

## 2025-03-18 DIAGNOSIS — E03.4 HYPOTHYROIDISM DUE TO ACQUIRED ATROPHY OF THYROID: ICD-10-CM

## 2025-03-18 DIAGNOSIS — R35.1 NOCTURIA: ICD-10-CM

## 2025-03-18 DIAGNOSIS — R35.0 BENIGN PROSTATIC HYPERPLASIA WITH URINARY FREQUENCY: ICD-10-CM

## 2025-03-19 DIAGNOSIS — N40.1 BENIGN PROSTATIC HYPERPLASIA WITH URINARY FREQUENCY: ICD-10-CM

## 2025-03-19 DIAGNOSIS — R35.0 BENIGN PROSTATIC HYPERPLASIA WITH URINARY FREQUENCY: ICD-10-CM

## 2025-03-19 DIAGNOSIS — E78.5 HYPERLIPIDEMIA, UNSPECIFIED HYPERLIPIDEMIA TYPE: ICD-10-CM

## 2025-03-19 DIAGNOSIS — R35.1 NOCTURIA: ICD-10-CM

## 2025-03-19 DIAGNOSIS — E03.4 HYPOTHYROIDISM DUE TO ACQUIRED ATROPHY OF THYROID: ICD-10-CM

## 2025-03-20 RX ORDER — FINASTERIDE 5 MG/1
5 TABLET, FILM COATED ORAL DAILY
Qty: 90 TABLET | Refills: 3 | Status: SHIPPED | OUTPATIENT
Start: 2025-03-20

## 2025-03-20 RX ORDER — TAMSULOSIN HYDROCHLORIDE 0.4 MG/1
0.4 CAPSULE ORAL DAILY
Qty: 90 CAPSULE | Refills: 3 | Status: SHIPPED | OUTPATIENT
Start: 2025-03-20

## 2025-03-20 RX ORDER — LEVOTHYROXINE SODIUM 112 UG/1
112 TABLET ORAL DAILY
Qty: 90 TABLET | Refills: 3 | Status: SHIPPED | OUTPATIENT
Start: 2025-03-20

## 2025-03-20 RX ORDER — ATORVASTATIN CALCIUM 80 MG/1
80 TABLET, FILM COATED ORAL DAILY
Qty: 90 TABLET | Refills: 3 | Status: SHIPPED | OUTPATIENT
Start: 2025-03-20

## 2025-03-20 RX ORDER — AMLODIPINE BESYLATE 10 MG/1
10 TABLET ORAL DAILY
Qty: 90 TABLET | Refills: 3 | Status: SHIPPED | OUTPATIENT
Start: 2025-03-20

## 2025-03-20 NOTE — TELEPHONE ENCOUNTER
Patient stated these were supposed to be sent earlier in he week and were not sent after his appt he is almost out.

## 2025-03-20 NOTE — TELEPHONE ENCOUNTER
Last Written Prescription:     amLODIPine (NORVASC) 10 MG tablet 90 tablet 3 3/11/2024 -- No   Sig - Route: Take 1 tablet (10 mg) by mouth daily - Oral     atorvastatin (LIPITOR) 80 MG tablet 90 tablet 3 3/11/2024 -- No   Sig - Route: Take 1 tablet (80 mg) by mouth daily - Oral     finasteride (PROSCAR) 5 MG tablet 90 tablet 3 3/11/2024 -- No   Sig - Route: Take 1 tablet (5 mg) by mouth daily - Ora     levothyroxine (SYNTHROID/LEVOTHROID) 112 MCG tablet 90 tablet 3 3/11/2024 -- No   Sig - Route: Take 1 tablet (112 mcg) by mouth daily - Oral     tamsulosin (FLOMAX) 0.4 MG capsule 90 capsule 3 3/11/2024 -- No   Sig - Route: Take 1 capsule (0.4 mg) by mouth daily - Oral     ----------------------  Last Visit Date: 3/17/25 [place]  Future Visit Date: None  ----------------------      Refill decision: Medication refilled per  Medication Refill in Ambulatory Care  policy.       Request from pharmacy:  Requested Prescriptions   Pending Prescriptions Disp Refills    amLODIPine (NORVASC) 10 MG tablet 90 tablet 3     Sig: Take 1 tablet (10 mg) by mouth daily.       Calcium Channel Blockers Protocol  Passed - 3/20/2025  2:28 PM        Passed - Most recent blood pressure under 140/90 in past 12 months     BP Readings from Last 3 Encounters:   03/17/25 131/78   04/11/24 122/72   03/11/24 137/81       No data recorded            Passed - Medication is active on med list and the sig matches. RN to manually verify dose and sig if red X/fail.              Passed - Medication is indicated for associated diagnosis        Passed - GFR is on file in the past 12 months and most recent GFR is normal        Passed - Recent (12 mo) or future (90 days) visit within the authorizing provider's specialty     The patient must have completed an in-person or virtual visit within the past 12 months or has a future visit scheduled within the next 90 days with the authorizing provider s specialty.  Urgent care and e-visits do not qualify as an office  visit for this protocol.          Passed - Patient is age 18 or older          atorvastatin (LIPITOR) 80 MG tablet 90 tablet 3     Sig: Take 1 tablet (80 mg) by mouth daily.       Antihyperlipidemic agents Passed - 3/20/2025  2:28 PM        Passed - LDL on file in the past 12 months        Passed - Medication is active on med list and the sig matches. RN to manually verify dose and sig if red X/fail.              Passed - Recent (12 mo) or future (90 days) visit within the authorizing provider's specialty     The patient must have completed an in-person or virtual visit within the past 12 months or has a future visit scheduled within the next 90 days with the authorizing provider s specialty.  Urgent care and e-visits do not qualify as an office visit for this protocol.          Passed - Patient is age 18 years or older          finasteride (PROSCAR) 5 MG tablet 90 tablet 3     Sig: Take 1 tablet (5 mg) by mouth daily.       BPH Agents Passed - 3/20/2025  2:28 PM        Passed - Medication is active on med list and the sig matches. RN to manually verify dose and sig if red X/fail.              Passed - Recent (12 mo) or future (90 days) visit within the authorizing provider's department     The patient must have completed an in-person or virtual visit within the past 12 months or has a future visit scheduled within the next 90 days with the authorizing provider s specialty.  Urgent care and e-visits do not qualify as an office visit for this protocol.          Passed - Medication indicated for associated diagnosis     Medication is associated with one or more of the following diagnoses:     Benign prostatic hyperplasia   Male pattern alopecia   Nocturia          Passed - Patient is 18 years of age or older          levothyroxine (SYNTHROID/LEVOTHROID) 112 MCG tablet 90 tablet 3     Sig: Take 1 tablet (112 mcg) by mouth daily.       Thyroid Protocol Passed - 3/20/2025  2:28 PM        Passed - Patient is 12 years or  older        Passed - Medication is active on med list and the sig matches. RN to manually verify dose and sig if red X/fail.              Passed - Recent (12 mo) or future (90 days) visit within the authorizing provider's specialty     The patient must have completed an in-person or virtual visit within the past 12 months or has a future visit scheduled within the next 90 days with the authorizing provider s specialty.  Urgent care and e-visits do not qualify as an office visit for this protocol.          Passed - Medication indicated for associated diagnosis     Medication is associated with one or more of the following diagnoses:  Hypothyroidism  Thyroid stimulating hormone suppression therapy  Thyroid cancer  Acquired atrophy of thyroid          Passed - Normal TSH on file in past 12 months     Recent Labs   Lab Test 03/17/25  0933   TSH 0.71                tamsulosin (FLOMAX) 0.4 MG capsule 90 capsule 3     Sig: Take 1 capsule (0.4 mg) by mouth daily.       Alpha Blockers Passed - 3/20/2025  2:28 PM        Passed - Most recent blood pressure under 140/90 in past 12 months     BP Readings from Last 3 Encounters:   03/17/25 131/78   04/11/24 122/72   03/11/24 137/81       No data recorded            Passed - Patient does not have Tadalafil, Vardenafil, or Sildenafil on their medication list        Passed - Medication is active on med list and the sig matches. RN to manually verify dose and sig if red X/fail.              Passed - Recent (12 mo) or future (90 days) visit within the authorizing provider's specialty     The patient must have completed an in-person or virtual visit within the past 12 months or has a future visit scheduled within the next 90 days with the authorizing provider s specialty.  Urgent care and e-visits do not qualify as an office visit for this protocol.          Passed - Medication indicated for associated diagnosis              Passed - Patient is 18 years of age or older

## 2025-03-26 RX ORDER — LEVOTHYROXINE SODIUM 112 UG/1
112 TABLET ORAL DAILY
Qty: 90 TABLET | Refills: 3 | OUTPATIENT
Start: 2025-03-26

## 2025-03-26 RX ORDER — TAMSULOSIN HYDROCHLORIDE 0.4 MG/1
0.4 CAPSULE ORAL DAILY
Qty: 90 CAPSULE | Refills: 3 | OUTPATIENT
Start: 2025-03-26

## 2025-03-26 RX ORDER — FINASTERIDE 5 MG/1
5 TABLET, FILM COATED ORAL DAILY
Qty: 90 TABLET | Refills: 3 | OUTPATIENT
Start: 2025-03-26

## 2025-03-26 RX ORDER — ATORVASTATIN CALCIUM 80 MG/1
80 TABLET, FILM COATED ORAL DAILY
Qty: 90 TABLET | Refills: 3 | OUTPATIENT
Start: 2025-03-26

## 2025-04-13 ENCOUNTER — HEALTH MAINTENANCE LETTER (OUTPATIENT)
Age: 75
End: 2025-04-13

## 2025-09-02 ENCOUNTER — OFFICE VISIT (OUTPATIENT)
Dept: INTERNAL MEDICINE | Facility: CLINIC | Age: 75
End: 2025-09-02
Payer: COMMERCIAL

## 2025-09-02 ENCOUNTER — LAB (OUTPATIENT)
Dept: LAB | Facility: CLINIC | Age: 75
End: 2025-09-02
Payer: COMMERCIAL

## 2025-09-02 VITALS
BODY MASS INDEX: 27.96 KG/M2 | TEMPERATURE: 97.4 F | HEART RATE: 70 BPM | WEIGHT: 188.8 LBS | DIASTOLIC BLOOD PRESSURE: 79 MMHG | RESPIRATION RATE: 16 BRPM | OXYGEN SATURATION: 98 % | HEIGHT: 69 IN | SYSTOLIC BLOOD PRESSURE: 131 MMHG

## 2025-09-02 DIAGNOSIS — L02.416 ABSCESS OF LEFT THIGH: Primary | ICD-10-CM

## 2025-09-02 DIAGNOSIS — L02.416 ABSCESS OF LEFT THIGH: ICD-10-CM

## 2025-09-02 LAB
BASOPHILS # BLD AUTO: 0.03 10E3/UL (ref 0–0.2)
BASOPHILS NFR BLD AUTO: 0.5 %
EOSINOPHIL # BLD AUTO: 0.13 10E3/UL (ref 0–0.7)
EOSINOPHIL NFR BLD AUTO: 2.1 %
ERYTHROCYTE [DISTWIDTH] IN BLOOD BY AUTOMATED COUNT: 12.9 % (ref 10–15)
HCT VFR BLD AUTO: 38.8 % (ref 40–53)
HGB BLD-MCNC: 13.1 G/DL (ref 13.3–17.7)
IMM GRANULOCYTES # BLD: <0.03 10E3/UL
IMM GRANULOCYTES NFR BLD: 0.3 %
LYMPHOCYTES # BLD AUTO: 1.81 10E3/UL (ref 0.8–5.3)
LYMPHOCYTES NFR BLD AUTO: 29.7 %
MCH RBC QN AUTO: 31.3 PG (ref 26.5–33)
MCHC RBC AUTO-ENTMCNC: 33.8 G/DL (ref 31.5–36.5)
MCV RBC AUTO: 92.8 FL (ref 78–100)
MONOCYTES # BLD AUTO: 0.51 10E3/UL (ref 0–1.3)
MONOCYTES NFR BLD AUTO: 8.4 %
NEUTROPHILS # BLD AUTO: 3.59 10E3/UL (ref 1.6–8.3)
NEUTROPHILS NFR BLD AUTO: 59 %
NRBC # BLD AUTO: <0.03 10E3/UL
NRBC BLD AUTO-RTO: 0 /100
PLATELET # BLD AUTO: 221 10E3/UL (ref 150–450)
RBC # BLD AUTO: 4.18 10E6/UL (ref 4.4–5.9)
WBC # BLD AUTO: 6.09 10E3/UL (ref 4–11)

## 2025-09-02 PROCEDURE — 99214 OFFICE O/P EST MOD 30 MIN: CPT | Performed by: NURSE PRACTITIONER

## 2025-09-02 PROCEDURE — 3075F SYST BP GE 130 - 139MM HG: CPT | Performed by: NURSE PRACTITIONER

## 2025-09-02 PROCEDURE — 85025 COMPLETE CBC W/AUTO DIFF WBC: CPT | Performed by: PATHOLOGY

## 2025-09-02 PROCEDURE — 3078F DIAST BP <80 MM HG: CPT | Performed by: NURSE PRACTITIONER

## 2025-09-02 PROCEDURE — 36415 COLL VENOUS BLD VENIPUNCTURE: CPT | Performed by: PATHOLOGY

## 2025-09-02 RX ORDER — DOXYCYCLINE 100 MG/1
100 CAPSULE ORAL 2 TIMES DAILY
Qty: 20 CAPSULE | Refills: 0 | Status: SHIPPED | OUTPATIENT
Start: 2025-09-02 | End: 2025-09-12

## 2025-09-02 RX ORDER — CEPHALEXIN 500 MG/1
500 CAPSULE ORAL 2 TIMES DAILY
Qty: 10 CAPSULE | Refills: 0 | Status: SHIPPED | OUTPATIENT
Start: 2025-09-02

## 2025-09-08 ENCOUNTER — PRE VISIT (OUTPATIENT)
Dept: SURGERY | Facility: CLINIC | Age: 75
End: 2025-09-08

## (undated) DEVICE — DAVINCI XI SEAL UNIVERSAL 5-8MM 470361

## (undated) DEVICE — SU VICRYL 0 CT-2 27" J334H

## (undated) DEVICE — SU MONOCRYL 4-0 PS-2 27" UND Y426H

## (undated) DEVICE — GLOVE PROTEXIS POWDER FREE SMT 7.5  2D72PT75X

## (undated) DEVICE — DAVINCI HOT SHEARS TIP COVER  400180

## (undated) DEVICE — DAVINCI XI MONOPOLAR SCISSORS HOT SHEARS 8MM 470179

## (undated) DEVICE — DAVINCI XI DRAPE ARM 470015

## (undated) DEVICE — DRSG GAUZE 4X4" 3033

## (undated) DEVICE — DAVINCI XI GRASPER ENDOWRIST PROGRASP 470093

## (undated) DEVICE — DAVINCI XI DRAPE COLUMN 470341

## (undated) DEVICE — BLADE CLIPPER SGL USE 9680

## (undated) DEVICE — SU STRATAFIX PDS PLUS 3-0 SPIRAL SH 15CM SXPP1B420

## (undated) DEVICE — LINEN TOWEL PACK X5 5464

## (undated) DEVICE — SOL WATER IRRIG 500ML BOTTLE 2F7113

## (undated) DEVICE — PREP CHLORAPREP 26ML TINTED ORANGE  260815

## (undated) DEVICE — ESU GROUND PAD ADULT W/CORD E7507

## (undated) DEVICE — SYR 30ML SLIP TIP W/O NDL 302833

## (undated) DEVICE — ESU PENCIL W/COATED BLADE E2450H

## (undated) DEVICE — PACK LAP CHOLE CUSTOM ASC

## (undated) DEVICE — SUPPORTER SCROTAL SUSPENSORY LG LATEX

## (undated) DEVICE — NDL INSUFFLATION 13GA 120MM C2201

## (undated) DEVICE — SU VICRYL 3-0 SH 27" J316H

## (undated) DEVICE — DRAPE MAYO STAND 23X54 8337

## (undated) DEVICE — GOWN XLG DISP 9545

## (undated) DEVICE — DAVINCI XI NDL DRIVER LARGE 470006

## (undated) DEVICE — TAPE MEDIPORE 4"X2YD 2864

## (undated) DEVICE — DRSG STERI STRIP 1/2X4" R1547

## (undated) RX ORDER — PROPOFOL 10 MG/ML
INJECTION, EMULSION INTRAVENOUS
Status: DISPENSED
Start: 2022-05-26

## (undated) RX ORDER — KETOROLAC TROMETHAMINE 30 MG/ML
INJECTION, SOLUTION INTRAMUSCULAR; INTRAVENOUS
Status: DISPENSED
Start: 2022-05-26

## (undated) RX ORDER — LIDOCAINE HCL/PF 100 MG/5ML
SYRINGE (ML) INJECTION
Status: DISPENSED
Start: 2022-05-26

## (undated) RX ORDER — ONDANSETRON 2 MG/ML
INJECTION INTRAMUSCULAR; INTRAVENOUS
Status: DISPENSED
Start: 2022-05-26

## (undated) RX ORDER — GLYCOPYRROLATE 0.2 MG/ML
INJECTION INTRAMUSCULAR; INTRAVENOUS
Status: DISPENSED
Start: 2022-05-26

## (undated) RX ORDER — FENTANYL CITRATE 50 UG/ML
INJECTION, SOLUTION INTRAMUSCULAR; INTRAVENOUS
Status: DISPENSED
Start: 2022-05-26

## (undated) RX ORDER — DEXAMETHASONE SODIUM PHOSPHATE 4 MG/ML
INJECTION, SOLUTION INTRA-ARTICULAR; INTRALESIONAL; INTRAMUSCULAR; INTRAVENOUS; SOFT TISSUE
Status: DISPENSED
Start: 2022-05-26

## (undated) RX ORDER — CEFAZOLIN SODIUM 2 G/50ML
SOLUTION INTRAVENOUS
Status: DISPENSED
Start: 2022-05-26

## (undated) RX ORDER — OXYCODONE HYDROCHLORIDE 5 MG/1
TABLET ORAL
Status: DISPENSED
Start: 2022-05-26

## (undated) RX ORDER — HYDROMORPHONE HYDROCHLORIDE 1 MG/ML
INJECTION, SOLUTION INTRAMUSCULAR; INTRAVENOUS; SUBCUTANEOUS
Status: DISPENSED
Start: 2022-05-26